# Patient Record
Sex: FEMALE | Race: BLACK OR AFRICAN AMERICAN | NOT HISPANIC OR LATINO | Employment: FULL TIME | URBAN - METROPOLITAN AREA
[De-identification: names, ages, dates, MRNs, and addresses within clinical notes are randomized per-mention and may not be internally consistent; named-entity substitution may affect disease eponyms.]

---

## 2023-12-14 ENCOUNTER — HOSPITAL ENCOUNTER (OUTPATIENT)
Facility: HOSPITAL | Age: 23
Setting detail: OBSERVATION
Discharge: HOME | End: 2023-12-17
Attending: EMERGENCY MEDICINE
Payer: COMMERCIAL

## 2023-12-14 DIAGNOSIS — Z79.4 TYPE 2 DIABETES MELLITUS WITHOUT COMPLICATION, WITH LONG-TERM CURRENT USE OF INSULIN (MULTI): ICD-10-CM

## 2023-12-14 DIAGNOSIS — E11.65 UNCONTROLLED TYPE 2 DIABETES MELLITUS WITH HYPERGLYCEMIA (MULTI): ICD-10-CM

## 2023-12-14 DIAGNOSIS — E11.9 TYPE 2 DIABETES MELLITUS WITHOUT COMPLICATION, WITH LONG-TERM CURRENT USE OF INSULIN (MULTI): ICD-10-CM

## 2023-12-14 DIAGNOSIS — Z79.4 TYPE 2 DIABETES MELLITUS WITH HYPERGLYCEMIA, WITH LONG-TERM CURRENT USE OF INSULIN (MULTI): Primary | ICD-10-CM

## 2023-12-14 DIAGNOSIS — E11.65 TYPE 2 DIABETES MELLITUS WITH HYPERGLYCEMIA, WITH LONG-TERM CURRENT USE OF INSULIN (MULTI): Primary | ICD-10-CM

## 2023-12-14 DIAGNOSIS — R73.9 HYPERGLYCEMIA: ICD-10-CM

## 2023-12-14 LAB
ALBUMIN SERPL BCP-MCNC: 3.9 G/DL (ref 3.4–5)
ALP SERPL-CCNC: 124 U/L (ref 33–110)
ALT SERPL W P-5'-P-CCNC: 9 U/L (ref 7–45)
ANION GAP BLDV CALCULATED.4IONS-SCNC: 16 MMOL/L (ref 10–25)
ANION GAP SERPL CALC-SCNC: 17 MMOL/L (ref 10–20)
AST SERPL W P-5'-P-CCNC: 12 U/L (ref 9–39)
BASE EXCESS BLDV CALC-SCNC: -0.1 MMOL/L (ref -2–3)
BASOPHILS # BLD AUTO: 0.04 X10*3/UL (ref 0–0.1)
BASOPHILS NFR BLD AUTO: 0.4 %
BILIRUB SERPL-MCNC: 0.4 MG/DL (ref 0–1.2)
BODY TEMPERATURE: 37 DEGREES CELSIUS
BUN SERPL-MCNC: 7 MG/DL (ref 6–23)
CA-I BLDV-SCNC: 1.24 MMOL/L (ref 1.1–1.33)
CALCIUM SERPL-MCNC: 9.8 MG/DL (ref 8.6–10.6)
CARDIAC TROPONIN I PNL SERPL HS: <3 NG/L (ref 0–34)
CHLORIDE BLDV-SCNC: 99 MMOL/L (ref 98–107)
CHLORIDE SERPL-SCNC: 98 MMOL/L (ref 98–107)
CO2 SERPL-SCNC: 26 MMOL/L (ref 21–32)
CREAT SERPL-MCNC: 0.5 MG/DL (ref 0.5–1.05)
EOSINOPHIL # BLD AUTO: 0.11 X10*3/UL (ref 0–0.7)
EOSINOPHIL NFR BLD AUTO: 1 %
ERYTHROCYTE [DISTWIDTH] IN BLOOD BY AUTOMATED COUNT: 15.1 % (ref 11.5–14.5)
GFR SERPL CREATININE-BSD FRML MDRD: >90 ML/MIN/1.73M*2
GLUCOSE BLD MANUAL STRIP-MCNC: 402 MG/DL (ref 74–99)
GLUCOSE BLDV-MCNC: 434 MG/DL (ref 74–99)
GLUCOSE SERPL-MCNC: 379 MG/DL (ref 74–99)
HCO3 BLDV-SCNC: 25.4 MMOL/L (ref 22–26)
HCT VFR BLD AUTO: 36.9 % (ref 36–46)
HCT VFR BLD EST: 38 % (ref 36–46)
HGB BLD-MCNC: 11.6 G/DL (ref 12–16)
HGB BLDV-MCNC: 12.5 G/DL (ref 12–16)
IMM GRANULOCYTES # BLD AUTO: 0.03 X10*3/UL (ref 0–0.7)
IMM GRANULOCYTES NFR BLD AUTO: 0.3 % (ref 0–0.9)
INHALED O2 CONCENTRATION: 0 %
LACTATE BLDV-SCNC: 1.1 MMOL/L (ref 0.4–2)
LYMPHOCYTES # BLD AUTO: 3.01 X10*3/UL (ref 1.2–4.8)
LYMPHOCYTES NFR BLD AUTO: 26.4 %
MAGNESIUM SERPL-MCNC: 1.65 MG/DL (ref 1.6–2.4)
MCH RBC QN AUTO: 24.7 PG (ref 26–34)
MCHC RBC AUTO-ENTMCNC: 31.4 G/DL (ref 32–36)
MCV RBC AUTO: 79 FL (ref 80–100)
MONOCYTES # BLD AUTO: 0.8 X10*3/UL (ref 0.1–1)
MONOCYTES NFR BLD AUTO: 7 %
NEUTROPHILS # BLD AUTO: 7.39 X10*3/UL (ref 1.2–7.7)
NEUTROPHILS NFR BLD AUTO: 64.9 %
NRBC BLD-RTO: 0 /100 WBCS (ref 0–0)
OXYHGB MFR BLDV: 60.6 % (ref 45–75)
PCO2 BLDV: 44 MM HG (ref 41–51)
PH BLDV: 7.37 PH (ref 7.33–7.43)
PHOSPHATE SERPL-MCNC: 3.5 MG/DL (ref 2.5–4.9)
PLATELET # BLD AUTO: 360 X10*3/UL (ref 150–450)
PO2 BLDV: 38 MM HG (ref 35–45)
POTASSIUM BLDV-SCNC: 4.1 MMOL/L (ref 3.5–5.3)
POTASSIUM SERPL-SCNC: 4.2 MMOL/L (ref 3.5–5.3)
PROT SERPL-MCNC: 8 G/DL (ref 6.4–8.2)
RBC # BLD AUTO: 4.7 X10*6/UL (ref 4–5.2)
SAO2 % BLDV: 62 % (ref 45–75)
SODIUM BLDV-SCNC: 136 MMOL/L (ref 136–145)
SODIUM SERPL-SCNC: 137 MMOL/L (ref 136–145)
WBC # BLD AUTO: 11.4 X10*3/UL (ref 4.4–11.3)

## 2023-12-14 PROCEDURE — 84100 ASSAY OF PHOSPHORUS: CPT | Performed by: EMERGENCY MEDICINE

## 2023-12-14 PROCEDURE — 82435 ASSAY OF BLOOD CHLORIDE: CPT | Performed by: EMERGENCY MEDICINE

## 2023-12-14 PROCEDURE — 82330 ASSAY OF CALCIUM: CPT | Performed by: EMERGENCY MEDICINE

## 2023-12-14 PROCEDURE — 85025 COMPLETE CBC W/AUTO DIFF WBC: CPT | Performed by: EMERGENCY MEDICINE

## 2023-12-14 PROCEDURE — 83735 ASSAY OF MAGNESIUM: CPT | Performed by: EMERGENCY MEDICINE

## 2023-12-14 PROCEDURE — 99285 EMERGENCY DEPT VISIT HI MDM: CPT | Performed by: EMERGENCY MEDICINE

## 2023-12-14 PROCEDURE — 85018 HEMOGLOBIN: CPT | Performed by: EMERGENCY MEDICINE

## 2023-12-14 PROCEDURE — 36415 COLL VENOUS BLD VENIPUNCTURE: CPT | Performed by: EMERGENCY MEDICINE

## 2023-12-14 PROCEDURE — 84484 ASSAY OF TROPONIN QUANT: CPT | Performed by: EMERGENCY MEDICINE

## 2023-12-14 PROCEDURE — 82947 ASSAY GLUCOSE BLOOD QUANT: CPT | Mod: 59

## 2023-12-14 PROCEDURE — 82947 ASSAY GLUCOSE BLOOD QUANT: CPT

## 2023-12-14 PROCEDURE — 99284 EMERGENCY DEPT VISIT MOD MDM: CPT | Performed by: EMERGENCY MEDICINE

## 2023-12-14 ASSESSMENT — COLUMBIA-SUICIDE SEVERITY RATING SCALE - C-SSRS
6. HAVE YOU EVER DONE ANYTHING, STARTED TO DO ANYTHING, OR PREPARED TO DO ANYTHING TO END YOUR LIFE?: NO
1. IN THE PAST MONTH, HAVE YOU WISHED YOU WERE DEAD OR WISHED YOU COULD GO TO SLEEP AND NOT WAKE UP?: NO
2. HAVE YOU ACTUALLY HAD ANY THOUGHTS OF KILLING YOURSELF?: NO

## 2023-12-15 PROBLEM — E11.65 HYPERGLYCEMIA DUE TO DIABETES MELLITUS (MULTI): Status: ACTIVE | Noted: 2023-12-15

## 2023-12-15 PROBLEM — E11.65 UNCONTROLLED TYPE 2 DIABETES MELLITUS WITH HYPERGLYCEMIA (MULTI): Status: ACTIVE | Noted: 2023-12-15

## 2023-12-15 LAB
ANION GAP SERPL CALC-SCNC: 16 MMOL/L (ref 10–20)
APPEARANCE UR: ABNORMAL
BACTERIA #/AREA URNS AUTO: ABNORMAL /HPF
BILIRUB UR STRIP.AUTO-MCNC: NEGATIVE MG/DL
BUN SERPL-MCNC: 8 MG/DL (ref 6–23)
CALCIUM SERPL-MCNC: 8.7 MG/DL (ref 8.6–10.6)
CHLORIDE SERPL-SCNC: 102 MMOL/L (ref 98–107)
CO2 SERPL-SCNC: 25 MMOL/L (ref 21–32)
COLOR UR: YELLOW
CREAT SERPL-MCNC: 0.36 MG/DL (ref 0.5–1.05)
ERYTHROCYTE [DISTWIDTH] IN BLOOD BY AUTOMATED COUNT: 14.8 % (ref 11.5–14.5)
EST. AVERAGE GLUCOSE BLD GHB EST-MCNC: 326 MG/DL
GFR SERPL CREATININE-BSD FRML MDRD: >90 ML/MIN/1.73M*2
GLUCOSE BLD MANUAL STRIP-MCNC: 245 MG/DL (ref 74–99)
GLUCOSE BLD MANUAL STRIP-MCNC: 246 MG/DL (ref 74–99)
GLUCOSE BLD MANUAL STRIP-MCNC: 281 MG/DL (ref 74–99)
GLUCOSE BLD MANUAL STRIP-MCNC: 288 MG/DL (ref 74–99)
GLUCOSE BLD MANUAL STRIP-MCNC: 306 MG/DL (ref 74–99)
GLUCOSE BLD MANUAL STRIP-MCNC: 326 MG/DL (ref 74–99)
GLUCOSE SERPL-MCNC: 262 MG/DL (ref 74–99)
GLUCOSE UR STRIP.AUTO-MCNC: ABNORMAL MG/DL
HBA1C MFR BLD: 13 %
HCT VFR BLD AUTO: 31.3 % (ref 36–46)
HGB BLD-MCNC: 9.9 G/DL (ref 12–16)
HOLD SPECIMEN: NORMAL
KETONES UR STRIP.AUTO-MCNC: ABNORMAL MG/DL
LEUKOCYTE ESTERASE UR QL STRIP.AUTO: ABNORMAL
MCH RBC QN AUTO: 24.5 PG (ref 26–34)
MCHC RBC AUTO-ENTMCNC: 31.6 G/DL (ref 32–36)
MCV RBC AUTO: 78 FL (ref 80–100)
NITRITE UR QL STRIP.AUTO: NEGATIVE
NRBC BLD-RTO: 0 /100 WBCS (ref 0–0)
PH UR STRIP.AUTO: 6 [PH]
PLATELET # BLD AUTO: 304 X10*3/UL (ref 150–450)
POTASSIUM SERPL-SCNC: 3.5 MMOL/L (ref 3.5–5.3)
PROT UR STRIP.AUTO-MCNC: ABNORMAL MG/DL
RBC # BLD AUTO: 4.04 X10*6/UL (ref 4–5.2)
RBC # UR STRIP.AUTO: ABNORMAL /UL
RBC #/AREA URNS AUTO: >20 /HPF
SODIUM SERPL-SCNC: 139 MMOL/L (ref 136–145)
SP GR UR STRIP.AUTO: 1.04
SQUAMOUS #/AREA URNS AUTO: ABNORMAL /HPF
UROBILINOGEN UR STRIP.AUTO-MCNC: <2 MG/DL
WBC # BLD AUTO: 10.4 X10*3/UL (ref 4.4–11.3)
WBC #/AREA URNS AUTO: >50 /HPF

## 2023-12-15 PROCEDURE — 96360 HYDRATION IV INFUSION INIT: CPT

## 2023-12-15 PROCEDURE — 2500000002 HC RX 250 W HCPCS SELF ADMINISTERED DRUGS (ALT 637 FOR MEDICARE OP, ALT 636 FOR OP/ED): Performed by: STUDENT IN AN ORGANIZED HEALTH CARE EDUCATION/TRAINING PROGRAM

## 2023-12-15 PROCEDURE — G0378 HOSPITAL OBSERVATION PER HR: HCPCS

## 2023-12-15 PROCEDURE — 87086 URINE CULTURE/COLONY COUNT: CPT | Performed by: EMERGENCY MEDICINE

## 2023-12-15 PROCEDURE — 96361 HYDRATE IV INFUSION ADD-ON: CPT

## 2023-12-15 PROCEDURE — 81001 URINALYSIS AUTO W/SCOPE: CPT | Performed by: EMERGENCY MEDICINE

## 2023-12-15 PROCEDURE — 85027 COMPLETE CBC AUTOMATED: CPT

## 2023-12-15 PROCEDURE — 2500000002 HC RX 250 W HCPCS SELF ADMINISTERED DRUGS (ALT 637 FOR MEDICARE OP, ALT 636 FOR OP/ED): Mod: SE,MUE | Performed by: PHYSICIAN ASSISTANT

## 2023-12-15 PROCEDURE — 2500000004 HC RX 250 GENERAL PHARMACY W/ HCPCS (ALT 636 FOR OP/ED): Performed by: STUDENT IN AN ORGANIZED HEALTH CARE EDUCATION/TRAINING PROGRAM

## 2023-12-15 PROCEDURE — 36415 COLL VENOUS BLD VENIPUNCTURE: CPT

## 2023-12-15 PROCEDURE — 83036 HEMOGLOBIN GLYCOSYLATED A1C: CPT

## 2023-12-15 PROCEDURE — 2500000002 HC RX 250 W HCPCS SELF ADMINISTERED DRUGS (ALT 637 FOR MEDICARE OP, ALT 636 FOR OP/ED): Performed by: PHYSICIAN ASSISTANT

## 2023-12-15 PROCEDURE — 99223 1ST HOSP IP/OBS HIGH 75: CPT | Performed by: PHYSICIAN ASSISTANT

## 2023-12-15 PROCEDURE — RXMED WILLOW AMBULATORY MEDICATION CHARGE

## 2023-12-15 PROCEDURE — 82947 ASSAY GLUCOSE BLOOD QUANT: CPT

## 2023-12-15 PROCEDURE — 80048 BASIC METABOLIC PNL TOTAL CA: CPT

## 2023-12-15 PROCEDURE — 94762 N-INVAS EAR/PLS OXIMTRY CONT: CPT | Mod: MUE

## 2023-12-15 PROCEDURE — 82947 ASSAY GLUCOSE BLOOD QUANT: CPT | Mod: 59

## 2023-12-15 RX ORDER — INSULIN LISPRO 100 [IU]/ML
0-10 INJECTION, SOLUTION INTRAVENOUS; SUBCUTANEOUS
Status: DISCONTINUED | OUTPATIENT
Start: 2023-12-15 | End: 2023-12-17 | Stop reason: HOSPADM

## 2023-12-15 RX ORDER — INSULIN DEGLUDEC 100 U/ML
20 INJECTION, SOLUTION SUBCUTANEOUS EVERY 24 HOURS
Status: DISCONTINUED | OUTPATIENT
Start: 2023-12-15 | End: 2023-12-16

## 2023-12-15 RX ORDER — INSULIN GLARGINE 100 [IU]/ML
20 INJECTION, SOLUTION SUBCUTANEOUS NIGHTLY
Qty: 15 ML | Refills: 0 | Status: SHIPPED | OUTPATIENT
Start: 2023-12-15 | End: 2023-12-17 | Stop reason: SDUPTHER

## 2023-12-15 RX ORDER — BLOOD-GLUCOSE SENSOR
1 EACH MISCELLANEOUS
Qty: 2 EACH | Refills: 0 | Status: SHIPPED | OUTPATIENT
Start: 2023-12-15 | End: 2024-01-09 | Stop reason: SDUPTHER

## 2023-12-15 RX ORDER — DEXTROSE MONOHYDRATE 100 MG/ML
0.3 INJECTION, SOLUTION INTRAVENOUS ONCE AS NEEDED
Status: DISCONTINUED | OUTPATIENT
Start: 2023-12-15 | End: 2023-12-17 | Stop reason: HOSPADM

## 2023-12-15 RX ORDER — INSULIN LISPRO 100 [IU]/ML
0-5 INJECTION, SOLUTION INTRAVENOUS; SUBCUTANEOUS
Status: DISCONTINUED | OUTPATIENT
Start: 2023-12-15 | End: 2023-12-15

## 2023-12-15 RX ORDER — INSULIN LISPRO 100 [IU]/ML
5 INJECTION, SOLUTION INTRAVENOUS; SUBCUTANEOUS
Status: DISCONTINUED | OUTPATIENT
Start: 2023-12-15 | End: 2023-12-16

## 2023-12-15 RX ORDER — ISOPROPYL ALCOHOL 70 ML/100ML
1 SWAB TOPICAL 4 TIMES DAILY
Qty: 400 EACH | Refills: 0 | Status: SHIPPED | OUTPATIENT
Start: 2023-12-15 | End: 2024-03-14

## 2023-12-15 RX ORDER — IBUPROFEN 200 MG
1 CAPSULE ORAL 4 TIMES DAILY
Qty: 400 STRIP | Refills: 0 | Status: SHIPPED | OUTPATIENT
Start: 2023-12-15 | End: 2024-03-25

## 2023-12-15 RX ORDER — BLOOD-GLUCOSE CONTROL, NORMAL
1 EACH MISCELLANEOUS 4 TIMES DAILY
Qty: 400 EACH | Refills: 0 | Status: SHIPPED | OUTPATIENT
Start: 2023-12-15

## 2023-12-15 RX ORDER — IBUPROFEN 200 MG
1 CAPSULE ORAL 4 TIMES DAILY
Qty: 400 STRIP | Refills: 0 | Status: SHIPPED | OUTPATIENT
Start: 2023-12-15

## 2023-12-15 RX ORDER — DEXTROSE 4 G
1 TABLET,CHEWABLE ORAL 4 TIMES DAILY
Qty: 1 EACH | Refills: 0 | Status: SHIPPED | OUTPATIENT
Start: 2023-12-15 | End: 2024-03-14

## 2023-12-15 RX ORDER — DEXTROSE 50 % IN WATER (D50W) INTRAVENOUS SYRINGE
25
Status: DISCONTINUED | OUTPATIENT
Start: 2023-12-15 | End: 2023-12-17 | Stop reason: HOSPADM

## 2023-12-15 RX ORDER — GLUCAGON INJECTION, SOLUTION 1 MG/.2ML
0.2 INJECTION, SOLUTION SUBCUTANEOUS ONCE AS NEEDED
Qty: 0.2 ML | Refills: 0 | Status: SHIPPED | OUTPATIENT
Start: 2023-12-15

## 2023-12-15 RX ORDER — INSULIN LISPRO 100 [IU]/ML
INJECTION, SOLUTION INTRAVENOUS; SUBCUTANEOUS
Qty: 15 ML | Refills: 0 | Status: SHIPPED | OUTPATIENT
Start: 2023-12-15 | End: 2024-01-09 | Stop reason: SDUPTHER

## 2023-12-15 RX ORDER — INSULIN LISPRO 100 [IU]/ML
5 INJECTION, SOLUTION INTRAVENOUS; SUBCUTANEOUS ONCE
Status: COMPLETED | OUTPATIENT
Start: 2023-12-15 | End: 2023-12-15

## 2023-12-15 RX ADMIN — INSULIN LISPRO 5 UNITS: 100 INJECTION, SOLUTION INTRAVENOUS; SUBCUTANEOUS at 01:27

## 2023-12-15 RX ADMIN — INSULIN LISPRO 8 UNITS: 100 INJECTION, SOLUTION INTRAVENOUS; SUBCUTANEOUS at 14:03

## 2023-12-15 RX ADMIN — INSULIN DEGLUDEC INJECTION 20 UNITS: 100 INJECTION, SOLUTION SUBCUTANEOUS at 20:11

## 2023-12-15 RX ADMIN — INSULIN LISPRO 5 UNITS: 100 INJECTION, SOLUTION INTRAVENOUS; SUBCUTANEOUS at 16:37

## 2023-12-15 RX ADMIN — INSULIN LISPRO 6 UNITS: 100 INJECTION, SOLUTION INTRAVENOUS; SUBCUTANEOUS at 16:36

## 2023-12-15 RX ADMIN — SODIUM CHLORIDE, POTASSIUM CHLORIDE, SODIUM LACTATE AND CALCIUM CHLORIDE 1000 ML: 600; 310; 30; 20 INJECTION, SOLUTION INTRAVENOUS at 01:28

## 2023-12-15 RX ADMIN — INSULIN LISPRO 4 UNITS: 100 INJECTION, SOLUTION INTRAVENOUS; SUBCUTANEOUS at 08:26

## 2023-12-15 ASSESSMENT — PAIN SCALES - GENERAL
PAINLEVEL_OUTOF10: 0 - NO PAIN

## 2023-12-15 ASSESSMENT — ENCOUNTER SYMPTOMS: FATIGUE: 1

## 2023-12-15 NOTE — ED PROVIDER NOTES
CC: general malaise     HPI:  23-year-old female with history of insulin-dependent type 2 diabetes presents to the emergency department with 1 to 2 weeks of increased fatigue, polyuria, and polydipsia.    Patient reports has been moving a lot recently, is out of all of her insulin over the last 1 to 2 months.  Has not been checking her blood sugar either.  Reports increased fatigue over the last few weeks and states she feels dehydrated.  Does have a prior history of DKA requiring MICU admission earlier this year.    Patient states she tried calling her pharmacy for insulin refills, but was told she was out of refills and need to speak with the provider.  Tried to reach out to her previous provider and was unable to get a hold of them.    Records Reviewed:  Recent available ED and inpatient notes reviewed in EMR.    PMHx/PSHx:  Per HPI.   - has a past medical history of Type 2 diabetes mellitus with ketoacidosis without coma (CMS/[facility]).  - has a past surgical history that includes Other surgical history (03/19/2019).  - has Hyperglycemia due to diabetes mellitus (CMS/[facility]) on their problem list.    Medications:  Reviewed in EMR. See EMR for complete list of medications and doses.    Allergies:  Patient has no known allergies.    Social History:  - Tobacco:  has no history on file for tobacco use.   - Alcohol:  has no history on file for alcohol use.   - Illicit Drugs:  has no history on file for drug use.     ROS:  Per HPI.       ???????????????????????????????????????????????????????????????  Triage Vitals:  T 36.2 °C (97.2 °F)    /82  RR 18  O2 98 %      Physical Exam  Vitals and nursing note reviewed.   Constitutional:       General: She is not in acute distress.  HENT:      Head: Atraumatic.      Mouth/Throat:      Mouth: Mucous membranes are dry.   Eyes:      General: No scleral icterus.     Conjunctiva/sclera: Conjunctivae normal.   Cardiovascular:      Rate and Rhythm: Regular rhythm.  Tachycardia present.      Heart sounds: No murmur heard.     No friction rub. No gallop.   Pulmonary:      Effort: No respiratory distress.      Breath sounds: Normal breath sounds. No wheezing or rales.   Chest:      Chest wall: No tenderness.   Abdominal:      Palpations: Abdomen is soft.      Tenderness: There is no abdominal tenderness.   Musculoskeletal:      Right lower leg: No edema.      Left lower leg: No edema.   Skin:     General: Skin is warm and dry.      Findings: No rash.   Neurological:      Mental Status: She is alert.      Cranial Nerves: No facial asymmetry.   Psychiatric:         Mood and Affect: Mood normal.         Behavior: Behavior normal.       ???????????????????????????????????????????????????????????????  Assessment and Plan:  23-year-old female with history of insulin-dependent type 2 diabetes presents to the emergency department for generalized fatigue, polyuria, and polydipsia.  Mildly tachycardic on arrival, blood sugar in the high 300s, but is not acidotic, reassuring against DKA.  Appears dry on exam which is likely contributing to the tachycardia, was ordered a liter of fluids for rehydration.    Was ordered subcutaneous lispro as well for blood sugar control.    Given the patient's out of all medications and supplies, do feel that she would benefit from coming in for diabetic education and refills on her medications.    Discussed with CDU, patient will be admitted for further management.    Social Determinants Limiting Care:  Difficulty paying for/obtaining medications and Housing insecurity    Disposition:  Admit to CDU    --  Blossom Ramos MD  Emergency Medicine, PGY-3      Procedures ? SmartLinks last updated 12/15/2023 1:24 AM        Blossom Ramos MD  Resident  12/15/23 0127

## 2023-12-15 NOTE — CONSULTS
Inpatient Diabetes Education Consult    Reason for Visit:  Brenda Lawton is a 23 y.o. female who presents for hyperglycemia 2' running out of insulin at home while in the process of moving from one residence to another    Consulting Service/Provider: Dr. SYLVIE Llamas    Visit Type: Initial visit    Visit Modality: In-person    Discharge Equipment/Supply Needs: she will need BG meter, strips, lancets; She will need insulin (pens and associated supplies); she will need Prudencio 3 CGM -- one placed in CDU.       Patient has supplies at home:  she has no supplies at home at this time.      Patient History and Assessment:  New diagnosis:  n/a  Previous diagnosis: Type 2  Patient known to Diabetes Education department: No  Treatment prior to hospital admission: Insulin: Rapid acting, Long acting  Blood glucose testing: Before Meals and Continuous glucose monitor  Complications: obesity  PTA Medications:    Current Outpatient Medications   Medication Instructions    blood-glucose sensor device USE AS DIRECTED TO CONTINUOUS GLUCOSE MONITORING. CHANGE EVERY 10 DAYS    Dexcom G4 platinum transmitter device FOR CONTINUOUS GLUCOSE MONITORING, CHANGE EVERY 90 DAYS.    doxycycline (Vibramycin) 100 mg capsule TAKE 1 CAPSULE BY MOUTH TWO TIMES A DAY    insulin glargine (Toujeo Max Solostar, 2 unit dial,) 300 unit/mL (3 mL) injection INJECT 100 UNITS UNDER THE SKIN AT BEDTIME       Glucose   Date/Time Value Ref Range Status   12/15/2023 05:41  (H) 74 - 99 mg/dL Final   12/14/2023 09:10  (H) 74 - 99 mg/dL Final   02/25/2023 04:27  (H) 74 - 99 mg/dL Final   02/24/2023 04:31  (H) 74 - 99 mg/dL Final   02/24/2023 03:29 PM CANCELED       Comment:     Result canceled by the ancillary.   02/24/2023 04:04  (H) 74 - 99 mg/dL Final   02/23/2023 12:03  (H) 74 - 99 mg/dL Final   02/23/2023 06:55  (H) 74 - 99 mg/dL Final   02/22/2023 12:22  (H) 74 - 99 mg/dL Final   02/22/2023 08:18  (H) 74 - 99  "mg/dL Final   02/22/2023 04:34  (HH) 74 - 99 mg/dL Final     Comment:     glu and bic   Called- RB to valeri barron  , 02/22/2023 05:08   11/26/2022 03:08  (H) 74 - 99 mg/dL Final     No results found for: \"CPEPTIDE\"  Hemoglobin A1C   Date Value Ref Range Status   12/15/2023 13.0 (H) see below % Final   02/22/2023 CANCELED       Comment:          Diagnosis of Diabetes-Adults   Non-Diabetic: < or = 5.6%   Increased risk for developing diabetes: 5.7-6.4%   Diagnostic of diabetes: > or = 6.5%  .       Monitoring of Diabetes                Age (y)     Therapeutic Goal (%)   Adults:          >18           <7.0   Pediatrics:    13-18           <7.5                   7-12           <8.0                   0- 6            7.5-8.5   American Diabetes Association. Diabetes Care 33(S1), Jan 2010.    Result canceled by the ancillary.     02/22/2023 12.8 (A) % Final     Comment:          Diagnosis of Diabetes-Adults   Non-Diabetic: < or = 5.6%   Increased risk for developing diabetes: 5.7-6.4%   Diagnostic of diabetes: > or = 6.5%  .       Monitoring of Diabetes                Age (y)     Therapeutic Goal (%)   Adults:          >18           <7.0   Pediatrics:    13-18           <7.5                   7-12           <8.0                   0- 6            7.5-8.5   American Diabetes Association. Diabetes Care 33(S1), Jan 2010.     07/14/2021 11.6 % Final     Comment:          Diagnosis of Diabetes-Adults   Non-Diabetic: < or = 5.6%   Increased risk for developing diabetes: 5.7-6.4%   Diagnostic of diabetes: > or = 6.5%  .       Monitoring of Diabetes                Age (y)     Therapeutic Goal (%)   Adults:          >18           <7.0   Pediatrics:    13-18           <7.5                   7-12           <8.0                   0- 6            7.5-8.5   American Diabetes Association. Diabetes Care 33(S1), Jan 2010.     02/25/2020 8.1 % Final     Comment:          Diagnosis of Diabetes-Adults   Non-Diabetic: < or = 5.6%   " Increased risk for developing diabetes: 5.7-6.4%   Diagnostic of diabetes: > or = 6.5%  .       Monitoring of Diabetes                Age (y)     Therapeutic Goal (%)   Adults:          >18           <7.0   Pediatrics:    13-18           <7.5                   7-12           <8.0                   0- 6            7.5-8.5   American Diabetes Association. Diabetes Care 33(S1), Jan 2010.         Patient Learning/Readiness Assessment:  Ms. Lawton is familiar with using insulin -- she is here because she ran out of her meds and needed to be seen by a provider for elevated BG and A1c.    Interventions/Topics Covered:  See After Visit Summary for handouts/information sheets provided to patient.  Education Documentation  No documentation found.        Additional topics covered: We reviewed her insulin regimen.  She is comfortable with the regimen she is currently ordered.    We reviewed importance of follow up and establishing care with PCP and/or endocrinology.  She is agreeable to referral with [facility].    She had worn a CGM in the past (Dex Com G-6).  Her current phone no longer supports dex com crissy but she does have ale 3 crissy in place.  Ale 3 CGM applied to her left upper post arm.  Device reviewed with her.    Additional materials provided: [employer] 3 information.    Trial CGM provided to patient: FreeStyle Ale    Education Outcome/Recommendations:        Recommendations for bedside nursing: Allow patient to self-inject insulin (supervised)    Recommendations for Providers: Inpatient consult to Endocrinology and Diabetes supplies needed for discharge    Additional Comments: Ms. Lawton is agreeable to follow up visits if she is admitted.  She does not anticipate hospital admission at this time.  Endocrinology team made aware of CGM placement.  Time spent:  30 mins.

## 2023-12-15 NOTE — ED TRIAGE NOTES
Pt reports feeling dry/dehydrated, nausea, SOB, excessive thirst. States she has hx of DKA and this feels similar. Pt did not check sugar today. Pt endorses noncompliance with checking BGL, her dexcom, her insulin and her diet. BGL in triage 402.

## 2023-12-15 NOTE — H&P
History and Physical  [facility] CLINICAL DECISION  Patient: Brenda Lawton  MRN: [id number]  : 2000  Date of Evaluation: 2023  ED Provider: MAGUE Verduzco      Limitations to history: None  Independent Historian: Patient  External Records Reviewed: Yes      Patient History:  Brenda Lawton is a 23 y.o. female with past medical history of T2DM, who presents to the clinical decision unit for hyperglycemia.  Patient reports presented to the ED for symptoms of this fruity breath, polyuria, polydipsia, shortness of breath, malaise and believes she was then ketoacidosis.  She states has not been able to take medication for a little over a month, due to recently moved and has no prescribing provider.  She denies fever, chills, urinary symptoms, nausea/vomiting, abdominal pain, chest pain or dizziness.    The acute medical evaluation included  -Initial POCT blood glucose 402  -VBG shows glucose at 434  -CBC shows no significant impairment  -CMP shows glucose elevated at 379, alkaline phosphorus at 124, otherwise no electrolyte or renal impairment.  -Troponin < 3  -Phosphorus 3.5  -Magnesium 1.6 by  -Urinalysis has trace of leukocyte Estrace, 2+ ketones, 3+ blood, 3+ glucose, 2+ protein and 1+ bacteria  -Lispro 5 units and 1 LR bolus  Last A1c on 2023 was 12.8  After discussion with the ED provider, a decision was made to admit the patient to the Clinical Decision Unit for hyperglycemia. Plan is continue fluids, repeat POCT glucose and consult endocrinology and diabetic education team.    In the emergency department, the acute evaluation included:  Orders Placed This Encounter   Procedures    Urine Culture    CBC and Auto Differential    Comprehensive Metabolic Panel    Troponin I, High Sensitivity    Blood Gas Venous Full Panel    Magnesium    Urinalysis with Reflex Microscopic and Culture    Phosphorus    Urinalysis with Reflex Microscopic and Culture    Extra Urine Gray  Tube    Microscopic Only, Urine    CBC    Basic metabolic panel    Hemoglobin A1C    Adult diet Carb Controlled; 90 gram carb/meal, 60 gram Carb evening snack    Vital Signs    Activity (specify) No Restrictions    Notify provider (specify parameters)    Notify provider (specify parameters)    Notify provider (specify parameters)    Glucose 10-70 mg/dL & CONSCIOUS- Give 15 Grams of Carbohydrates and repeat until blood glucose level reaches 100 mg/dL or greater.    Notify provider (specify parameters)    Notify provider (specify parameters)    Notify provider (specify parameters)    Full code    Inpatient consult to Endocrinology    POCT pregnancy, urine manually resulted    POCT GLUCOSE    POCT GLUCOSE    POCT GLUCOSE    Electrocardiogram, 12-lead PRN ACS symptoms    Insert and maintain peripheral IV    Send to CDU    Initiate observation status       I reviewed the below labs and imaging as ordered by the ED provider:  No orders to display       Labs Reviewed   CBC WITH AUTO DIFFERENTIAL - Abnormal       Result Value    WBC 11.4 (*)     nRBC 0.0      RBC 4.70      Hemoglobin 11.6 (*)     Hematocrit 36.9      MCV 79 (*)     MCH 24.7 (*)     MCHC 31.4 (*)     RDW 15.1 (*)     Platelets 360      Neutrophils % 64.9      Immature Granulocytes %, Automated 0.3      Lymphocytes % 26.4      Monocytes % 7.0      Eosinophils % 1.0      Basophils % 0.4      Neutrophils Absolute 7.39      Immature Granulocytes Absolute, Automated 0.03      Lymphocytes Absolute 3.01      Monocytes Absolute 0.80      Eosinophils Absolute 0.11      Basophils Absolute 0.04     COMPREHENSIVE METABOLIC PANEL - Abnormal    Glucose 379 (*)     Sodium 137      Potassium 4.2      Chloride 98      Bicarbonate 26      Anion Gap 17      Urea Nitrogen 7      Creatinine 0.50      eGFR >90      Calcium 9.8      Albumin 3.9      Alkaline Phosphatase 124 (*)     Total Protein 8.0      AST 12      Bilirubin, Total 0.4      ALT 9     BLOOD GAS VENOUS FULL PANEL -  Abnormal    POCT pH, Venous 7.37      POCT pCO2, Venous 44      POCT pO2, Venous 38      POCT SO2, Venous 62      POCT Oxy Hemoglobin, Venous 60.6      POCT Hematocrit Calculated, Venous 38.0      POCT Sodium, Venous 136      POCT Potassium, Venous 4.1      POCT Chloride, Venous 99      POCT Ionized Calicum, Venous 1.24      POCT Glucose, Venous 434 (*)     POCT Lactate, Venous 1.1      POCT Base Excess, Venous -0.1      POCT HCO3 Calculated, Venous 25.4      POCT Hemoglobin, Venous 12.5      POCT Anion Gap, Venous 16.0      Patient Temperature 37.0      FiO2 0     URINALYSIS WITH REFLEX MICROSCOPIC AND CULTURE - Abnormal    Color, Urine Yellow      Appearance, Urine Hazy (*)     Specific Gravity, Urine 1.039 (*)     pH, Urine 6.0      Protein, Urine 100 (2+) (*)     Glucose, Urine >=500 (3+) (*)     Blood, Urine LARGE (3+) (*)     Ketones, Urine 80 (2+) (*)     Bilirubin, Urine NEGATIVE      Urobilinogen, Urine <2.0      Nitrite, Urine NEGATIVE      Leukocyte Esterase, Urine TRACE (*)    MICROSCOPIC ONLY, URINE - Abnormal    WBC, Urine >50 (*)     RBC, Urine >20 (*)     Squamous Epithelial Cells, Urine 1-9 (SPARSE)      Bacteria, Urine 1+ (*)    POCT GLUCOSE - Abnormal    POCT Glucose 402 (*)    POCT GLUCOSE - Abnormal    POCT Glucose 326 (*)    TROPONIN I, HIGH SENSITIVITY - Normal    Troponin I, High Sensitivity <3      Narrative:     Less than 99th percentile of normal range cutoff-  Female and children under 18 years old <35 ng/L; Male <54 ng/L: Negative  Repeat testing should be performed if clinically indicated.     Female and children under 18 years old  ng/L; Male  ng/L:  Consistent with possible cardiac damage and possible increased clinical   risk. Serial measurements may help to assess extent of myocardial damage.     >120 ng/L: Consistent with cardiac damage, increased clinical risk and  myocardial infarction. Serial measurements may help assess extent of   myocardial damage.      NOTE:  Children less than 1 year old may have higher baseline troponin   levels and results should be interpreted in conjunction with the overall   clinical context.    NOTE: Troponin I testing is performed using a different   testing methodology at [facility] than at other   [facility] hospitals. Direct result comparisons should only   be made within the same method.     MAGNESIUM - Normal    Magnesium 1.65     PHOSPHORUS - Normal    Phosphorus 3.5     URINE CULTURE   URINALYSIS WITH REFLEX MICROSCOPIC AND CULTURE    Narrative:     The following orders were created for panel order Urinalysis with Reflex Microscopic and Culture.  Procedure                               Abnormality         Status                     ---------                               -----------         ------                     Urinalysis with Reflex M...[[id number]]  Abnormal            Final result               Extra Urine Gray Tube[[id number]]                            Final result                 Please view results for these tests on the individual orders.   EXTRA URINE GRAY TUBE    Extra Tube Hold for add-ons.     CBC   BASIC METABOLIC PANEL   HEMOGLOBIN A1C   POCT PREGNANCY, URINE   POCT GLUCOSE METER   POCT GLUCOSE METER   POCT GLUCOSE METER   POCT GLUCOSE METER   POCT GLUCOSE METER   POCT GLUCOSE METER         Past History     Past Medical History:   Diagnosis Date    Type 2 diabetes mellitus with ketoacidosis without coma (CMS/[facility]) 01/12/2016    Diabetic ketoacidosis in pediatric patient     Past Surgical History:   Procedure Laterality Date    OTHER SURGICAL HISTORY  03/19/2019    Cholecystectomy laparoscopic     Social History     Socioeconomic History    Marital status: Single     Spouse name: Not on file    Number of children: Not on file    Years of education: Not on file    Highest education level: Not on file   Occupational History    Not on file   Tobacco Use    Smoking status: Not on file    Smokeless tobacco: Not on file  "  Substance and Sexual Activity    Alcohol use: Not on file    Drug use: Not on file    Sexual activity: Not on file   Other Topics Concern    Not on file   Social History Narrative    Not on file     Social Determinants of Health     Financial Resource Strain: Not on file   Food Insecurity: Not on file   Transportation Needs: Not on file   Physical Activity: Not on file   Stress: Not on file   Social Connections: Not on file   Intimate Partner Violence: Not on file   Housing Stability: Not on file         Medications/Allergies     Previous Medications    BLOOD-GLUCOSE SENSOR DEVICE    USE AS DIRECTED TO CONTINUOUS GLUCOSE MONITORING. CHANGE EVERY 10 DAYS    DEXCOM G4 PLATINUM TRANSMITTER DEVICE    FOR CONTINUOUS GLUCOSE MONITORING, CHANGE EVERY 90 DAYS.    DOXYCYCLINE (VIBRAMYCIN) 100 MG CAPSULE    TAKE 1 CAPSULE BY MOUTH TWO TIMES A DAY    INSULIN GLARGINE (TOUJEO MAX SOLOSTAR, 2 UNIT DIAL,) 300 UNIT/ML (3 ML) INJECTION    INJECT 100 UNITS UNDER THE SKIN AT BEDTIME     No Known Allergies    Review of systems:  All systems reviewed and otherwise negative, except as stated above in HPI.    Upon admission to the Clinical Decision Unit,  Brenda Lawton is alert and oriented and appears in no acute distress.  Vital signs are within normal limits.  Patient nontoxic appearing and in no acute distress.  Physical examination unremarkable.  Patient educated on plan of care, verbalized understanding.  Patient remained hemodynamically stable and neurologically intact throughout the night.    Physical Exam     Visit Vitals  /85 (BP Location: Right arm, Patient Position: Lying)   Pulse 82   Temp 36 °C (96.8 °F) (Temporal)   Resp 17   Ht 1.778 m (5' 10\")   Wt 118 kg (260 lb)   SpO2 100%   BMI 37.31 kg/m²   BSA 2.41 m²       GENERAL:  The patient appears nourished and normally developed. Vital signs as documented.     HEENT:  Head normocephalic, atraumatic, EOMs intact, PERRLA, Mucous membranes moist. Nares patent " without copious rhinorrhea.  Oropharynx moist and clear.  Uvula midline.    Neck: Supple.  No meningismus.  No swelling.  Trachea midline. No lymphadenopathy.    Pulmonary:  Lungs are clear to auscultation, without any respiratory distress.  No wheezing, crackles or rales.  No hypoxia or dyspnea.  Able to speak full sentences, no accessory muscle use.    Cardia:   Regular rate and rhythm. No murmurs, rubs or gallops.  No JVD.    GI:  Soft, non-distended, non-tender, BS positive x 4 quadrants, No rebound or guarding, no peritoneal signs, no CVA tenderness, no masses or organomegaly.    Musculoskeletal: Symmetrical muscle bulk.  No peripheral edema.  Pulses intact distal.  Able to walk.    Integumentary: Warm, dry and intact.  No pallor or jaundice.  No lesions, rashes or open sores.    NEURO:  No obvious neurological deficits, normal sensation and strength bilaterally.  Speech clear and fluent.  Able to follow commands, CN 2-12 intact.    Psych: Appropriate mood and affect.    Consultants:  1.)  Endocrinology pending        PRN medications: dextrose 10 % in water (D10W), dextrose, glucagon     Impression and Plan  In summary, Brenda Lawton is admitted to the [facility] Clinical Decision Unit for hyperglycemia. Dr. Joleen Puri  is the CDU admission attending.    This patient has been risk-stratified based on available history, physical exam, and related study findings. Admission to the observation status for further diagnosis/treatment/monitoring of hyperglycemia is warranted clinically. This extended period of observation is specifically required to determine the need for hospitalization.     The goals of this admission based on the patient’s clinical problem list are:   1.)  Hyperglycemia  -Accu-checks  -Repeat labs in a.m.  -A1c pending  -Continue LR, repeat POCT.  -POCT glucose at 0421 is 246  -Consulted endocrinology      We will observe the patient for the following endpoints:    1.)  Symptomatic improvement  2.)  No worsening or new symptoms.  3.)  Blood glucose<250  4.)  Symptomatic improvement    When met, appropriate disposition will be arranged.    Geetha Chakraborty, APRN-CNP  [facility]  Emergency department  Extension [phone]

## 2023-12-15 NOTE — PROGRESS NOTES
Progress Note  Clinical Decision Unit  Patient: Brenda Lawton  MRN: [id number]  : 2000    Subjective  Brenda Lawton has been admitted to the CDU for 16 hours. Serial assessments of Brenda Lawton's clinical progress include:  1) Overall feeling well, is a bit weak, eating       Objective  VS reviewed  Physical Exam:    Appearance: Alert, oriented, cooperative, in no acute distress. Well nourished & well hydrated.    Skin: Warm, intact and dry. No lesions, rash, petechiae or purpura.     Eyes: PERRLA, EOMs intact. Conjunctiva pink without injection or exudates. No scleral icterus, hyphema, or subconjunctival hemorrhage.    Neck: Supple, without meningismus.    Pulmonary: Clear bilaterally with good chest wall excursion.    Cardiac: Normal S1, S2 without murmur, rub, gallop or extrasystole. No reproducible chest tenderness.    Abdomen: Soft, nontender    Genitourinary: Exam deferred.    Musculoskeletal: Spontaneously moving all extremities without limitation.     Neurological:  Finger-nose touch is normal, normal sensation, no weakness, no focal findings identified. Ambulating without assistance with steady gait    Psychiatric: Appropriate mood and affect. Kempt appearance.      Assessment / Plan  Brenda Lawton continues to be managed in accordance with the CDU clinical guidelines for hyperglycemia. An update of their clinical problem list included:     Hyperglycemia:   -Endo consulted: appreciate reccs: Tresiba 20 units daily, 5 units Lispro with meals + Sliding Scale Lispro #2   -Diabetic diet  -Diabetes education - completed   -Ordered for meds to beds with plan for discharge tomorrow   -Accuchecks before meals/bedtime     Richard Nathan PA-C   Emergency Medicine/Clinical Decision Unit   [facility]

## 2023-12-15 NOTE — CONSULTS
Inpatient consult to Endocrinology  Consult performed by: Shoshana Toure PA-C  Consult ordered by: RASHEEDA Verduzco-CNP  Reason for consult: DM2 with hyperglycemia 2/2 lack of access to medication  Assessment/Recommendations: Pt would benefit from start of weight-based insulin regimen and endocrine follow up after discharge with CGM           Reason For Consult  DM2 with hyperglycemia 2/2 lack of access to medication     History Of Present Illness  Brenda Lawton is a 23 y.o. female presenting with history of insulin-dependent type 2 diabetes since age 13 who presented to the emergency department with 1 to 2 weeks of increased fatigue, polyuria, and polydipsia.     Patient reports has been moving a lot recently, is out of all of her insulin over the last 1 to 2 months.  Has not been checking her blood sugar either.  Reports increased fatigue over the last few weeks and states she feels dehydrated.  Does have a prior history of DKA requiring MICU admission earlier this year.     Patient states she tried calling her pharmacy for insulin refills, but was told she was out of refills and need to speak with the provider.  Tried to reach out to her previous provider and was unable to get a hold of them..    Diabetes History  Outpatient provider for endocrine care PCP, date of last visit 8/30/23 phone call   Initial diabetes diagnosis was made (year/age) July 2014 as DM2 by peds zoë   Known complications due to diabetes include: hyperglycemia    Home Management    Results from Most Recent A1C  Hemoglobin A1C   Date/Time Value Ref Range Status   12/15/2023 05:41 AM 13.0 (H) see below % Final        Diabetes Problem List Entries with Dates  Problem List:  2023-12: Hyperglycemia due to diabetes mellitus (CMS/[facility])  2023-12: Uncontrolled type 2 diabetes mellitus with hyperglycemia   (CMS/[facility])      History of DKA with Dates:     History where DKA was Reason for Admission in last year 2/2023  Prior DKA was  "12/2017    Insulin administered via shot    Insulin Dose: lantus 20 units, lispro sliding scale, trulicity 0.75mg, dexcom CGM      Past Medical History  She has a past medical history of Type 2 diabetes mellitus with ketoacidosis without coma (CMS/HCC) (01/12/2016).    Surgical History  ERCP in 4/2019 for cholelithiasis      Social History  She has no history on file for tobacco use, alcohol use, and drug use.    Family History  No family history on file.     Allergies  Patient has no known allergies.    Review of Systems   Constitutional:  Positive for fatigue.   All other systems reviewed and are negative.       Physical Exam  Constitutional:       Appearance: Normal appearance. She is obese.      Comments: Very pleasant   HENT:      Head: Normocephalic and atraumatic.      Nose: Nose normal.      Mouth/Throat:      Mouth: Mucous membranes are moist.      Pharynx: Oropharynx is clear.   Eyes:      Extraocular Movements: Extraocular movements intact.      Conjunctiva/sclera: Conjunctivae normal.   Cardiovascular:      Rate and Rhythm: Normal rate and regular rhythm.      Pulses: Normal pulses.   Pulmonary:      Effort: Pulmonary effort is normal.   Abdominal:      General: Abdomen is flat. Bowel sounds are normal.      Palpations: Abdomen is soft.   Skin:     General: Skin is warm and dry.   Neurological:      General: No focal deficit present.      Mental Status: She is alert and oriented to person, place, and time. Mental status is at baseline.   Psychiatric:         Mood and Affect: Mood normal.         Behavior: Behavior normal.         Thought Content: Thought content normal.         Judgment: Judgment normal.          ROS, PMH, FH/SH, surgical history and allergies have been reviewed.    Last Recorded Vitals  Blood pressure 122/81, pulse 72, temperature 36.6 °C (97.9 °F), temperature source Tympanic, resp. rate 18, height 1.778 m (5' 10\"), weight 118 kg (260 lb), SpO2 99 %.    Relevant Results  Results from " "last 7 days   Lab Units 12/15/23  1201 12/15/23  0541 12/15/23  0528 12/15/23  0421 12/15/23  0200 12/14/23  2110 12/14/23  1926   POCT GLUCOSE mg/dL 306*  --  245* 246* 326*  --  402*   GLUCOSE mg/dL  --  262*  --   --   --  379*  --      Scheduled medications  insulin degludec, 20 Units, subcutaneous, q24h  insulin lispro, 0-10 Units, subcutaneous, TID with meals  insulin lispro, 5 Units, subcutaneous, TID with meals      Continuous medications     PRN medications  PRN medications: dextrose 10 % in water (D10W), dextrose, glucagon       Assessment/Plan   Principal Problem:    Hyperglycemia due to diabetes mellitus (CMS/[facility])  Active Problems:    Uncontrolled type 2 diabetes mellitus with hyperglycemia (CMS/[facility])    - Goal BG <180  - start degludec 20u at bedtime        NPO or glucose trending <100mg/dL : reduce to 12u   - start lispro 5u with meals plus scale        NPO or glucose <90mg/dL within 1hr before meal: hold   - continue lispro corrective scale #2 with meals   = 0u  151-200 = 2u  201-250 = 4u  251-300 = 6u  301-350 = 8u  351-400 = 10u    -Accuchecks (not BMP) TIDAC and QHS- kindly ensure QHS Accucheck is drawn; it is often missed   -Hypoglycemia protocol  -Diabetes Diet- low carb 60 CHO  -Will continue to follow and titrate insulin accordingly    Dispo: pt can expect to discharge home tomorrow AM with basal-bolus insulin regimen and CGM. She would benefit from endocrinology follow up.     in terms of ordering supplies, to make it easy, if you go under discharge tab, and click discharge orders on the left, when you go to write discharge orders, if you click on the \"new\" button with the green plus sign, and click \"follow users\" you can search for Kaitlin España PA-C. All insulins and DM supplies saved so you can just select from that list if it is helpful!       HOME GOING RECS  - please order tresiba flex pen U-100 or toujeo solostar U-300 insulin pen \"inject 20 units under the skin once before bed\" " "30 days = 5 pens (15mL of tresiba or 4.5mL of toujeo)  - please order insulin lispro U-100 insulin pen \"inject 5 units plus sliding scale three times daily with meals  = 0u, 151-200 = 2u, 201-250 = 4u, 251-300 = 6u, 301-350 = 8u, 351-400+ = 10u, use up to 40 units daily\" 30 days = 5 pens (15mL)  - please order insulin pen needles 32G 4mm for QID injections, 90 days = 400 pen needles  - please order glucose testing supplies for QID glucose measurement, 90 days = 1 glucose meter, 400 lancets and 400 strips  - please order alcohol swabs  - please order a form of glucagon rescue kit such as baqsimi or Gvoke hypopen  - endocrine will order ale 3 CGM supplies   **write in comment section on all supplies ordered: \"substitutions may be made by pharmacist depending on insurance coverage and what the pharmacy has available\"        I spent 80 minutes in the professional and overall care of this patient.      Shoshana Toure PA-C    "

## 2023-12-16 ENCOUNTER — PHARMACY VISIT (OUTPATIENT)
Dept: PHARMACY | Facility: CLINIC | Age: 23
End: 2023-12-16
Payer: MEDICAID

## 2023-12-16 LAB
BACTERIA UR CULT: NORMAL
GLUCOSE BLD MANUAL STRIP-MCNC: 282 MG/DL (ref 74–99)
GLUCOSE BLD MANUAL STRIP-MCNC: 283 MG/DL (ref 74–99)
GLUCOSE BLD MANUAL STRIP-MCNC: 290 MG/DL (ref 74–99)
GLUCOSE BLD MANUAL STRIP-MCNC: 309 MG/DL (ref 74–99)
GLUCOSE BLD MANUAL STRIP-MCNC: 345 MG/DL (ref 74–99)

## 2023-12-16 PROCEDURE — 99233 SBSQ HOSP IP/OBS HIGH 50: CPT

## 2023-12-16 PROCEDURE — 2500000004 HC RX 250 GENERAL PHARMACY W/ HCPCS (ALT 636 FOR OP/ED): Mod: SE

## 2023-12-16 PROCEDURE — 82947 ASSAY GLUCOSE BLOOD QUANT: CPT | Mod: 59

## 2023-12-16 PROCEDURE — 2500000002 HC RX 250 W HCPCS SELF ADMINISTERED DRUGS (ALT 637 FOR MEDICARE OP, ALT 636 FOR OP/ED): Mod: SE | Performed by: PHYSICIAN ASSISTANT

## 2023-12-16 PROCEDURE — 96361 HYDRATE IV INFUSION ADD-ON: CPT

## 2023-12-16 PROCEDURE — 94762 N-INVAS EAR/PLS OXIMTRY CONT: CPT

## 2023-12-16 PROCEDURE — G0378 HOSPITAL OBSERVATION PER HR: HCPCS

## 2023-12-16 RX ORDER — INSULIN LISPRO 100 [IU]/ML
8 INJECTION, SOLUTION INTRAVENOUS; SUBCUTANEOUS
Status: DISCONTINUED | OUTPATIENT
Start: 2023-12-16 | End: 2023-12-16

## 2023-12-16 RX ORDER — SODIUM CHLORIDE 9 MG/ML
125 INJECTION, SOLUTION INTRAVENOUS CONTINUOUS
Status: DISCONTINUED | OUTPATIENT
Start: 2023-12-16 | End: 2023-12-17 | Stop reason: HOSPADM

## 2023-12-16 RX ORDER — INSULIN LISPRO 100 [IU]/ML
10 INJECTION, SOLUTION INTRAVENOUS; SUBCUTANEOUS
Status: DISCONTINUED | OUTPATIENT
Start: 2023-12-16 | End: 2023-12-17

## 2023-12-16 RX ORDER — INSULIN DEGLUDEC 100 U/ML
15 INJECTION, SOLUTION SUBCUTANEOUS ONCE
Status: COMPLETED | OUTPATIENT
Start: 2023-12-16 | End: 2023-12-16

## 2023-12-16 RX ORDER — INSULIN DEGLUDEC 100 U/ML
35 INJECTION, SOLUTION SUBCUTANEOUS NIGHTLY
Status: DISCONTINUED | OUTPATIENT
Start: 2023-12-16 | End: 2023-12-17

## 2023-12-16 RX ORDER — INSULIN DEGLUDEC 100 U/ML
25 INJECTION, SOLUTION SUBCUTANEOUS EVERY 24 HOURS
Status: DISCONTINUED | OUTPATIENT
Start: 2023-12-16 | End: 2023-12-16

## 2023-12-16 RX ORDER — INSULIN DEGLUDEC 100 U/ML
35 INJECTION, SOLUTION SUBCUTANEOUS EVERY 24 HOURS
Status: DISCONTINUED | OUTPATIENT
Start: 2023-12-17 | End: 2023-12-16

## 2023-12-16 RX ADMIN — INSULIN LISPRO 8 UNITS: 100 INJECTION, SOLUTION INTRAVENOUS; SUBCUTANEOUS at 09:03

## 2023-12-16 RX ADMIN — SODIUM CHLORIDE 500 ML: 9 INJECTION, SOLUTION INTRAVENOUS at 22:15

## 2023-12-16 RX ADMIN — INSULIN LISPRO 6 UNITS: 100 INJECTION, SOLUTION INTRAVENOUS; SUBCUTANEOUS at 09:02

## 2023-12-16 RX ADMIN — SODIUM CHLORIDE 125 ML/HR: 9 INJECTION, SOLUTION INTRAVENOUS at 23:50

## 2023-12-16 RX ADMIN — INSULIN DEGLUDEC INJECTION 35 UNITS: 100 INJECTION, SOLUTION SUBCUTANEOUS at 18:15

## 2023-12-16 RX ADMIN — INSULIN DEGLUDEC INJECTION 15 UNITS: 100 INJECTION, SOLUTION SUBCUTANEOUS at 11:52

## 2023-12-16 RX ADMIN — INSULIN LISPRO 10 UNITS: 100 INJECTION, SOLUTION INTRAVENOUS; SUBCUTANEOUS at 17:07

## 2023-12-16 RX ADMIN — INSULIN LISPRO 10 UNITS: 100 INJECTION, SOLUTION INTRAVENOUS; SUBCUTANEOUS at 12:17

## 2023-12-16 RX ADMIN — INSULIN LISPRO 6 UNITS: 100 INJECTION, SOLUTION INTRAVENOUS; SUBCUTANEOUS at 17:06

## 2023-12-16 RX ADMIN — INSULIN LISPRO 8 UNITS: 100 INJECTION, SOLUTION INTRAVENOUS; SUBCUTANEOUS at 12:17

## 2023-12-16 ASSESSMENT — ENCOUNTER SYMPTOMS
UNEXPECTED WEIGHT CHANGE: 0
HEADACHES: 0
DIARRHEA: 0
VOMITING: 0
EYE PAIN: 0
BRUISES/BLEEDS EASILY: 0
SHORTNESS OF BREATH: 0
ABDOMINAL PAIN: 0
NAUSEA: 0
POLYPHAGIA: 0
ACTIVITY CHANGE: 0
POLYDIPSIA: 0
DIAPHORESIS: 0
FATIGUE: 1
APPETITE CHANGE: 0
AGITATION: 0
SORE THROAT: 0
CHEST TIGHTNESS: 0
PALPITATIONS: 0
FREQUENCY: 0
LIGHT-HEADEDNESS: 0
DIZZINESS: 0
TROUBLE SWALLOWING: 0
EYE REDNESS: 0
JOINT SWELLING: 0
NUMBNESS: 0
COUGH: 0
CONFUSION: 0
DYSURIA: 0

## 2023-12-16 ASSESSMENT — PAIN SCALES - GENERAL
PAINLEVEL_OUTOF10: 0 - NO PAIN

## 2023-12-16 NOTE — PROGRESS NOTES
Daily Progress Note  [facility] CLINICAL DECISION    Subjective  Brenda Lawton has been admitted to the CDU for a total of 28 hours for hyperglycemia. Serial assessments of the patient's clinical progress include:  Patient had an uneventful evening. On my evaluation patient stated that she is overall feeling well. She denies any near syncope, syncope, diaphoresis or symptoms of hypoglycemia since endocrinology add the additional 5 units at meal time to her current Lispro SSI #2 regiment.   Point of care glucose obtained last night prior to bedtime is still elevated at 281. Will repeat accu-check in AM to assess efficacy of additional lispro units.     Objective  PHYSICAL EXAM:   Vital signs reviewed and noted no distress. Afebrile.     General: Patient is conversant, well-appearing and well-nourished. NAD.     Head: Normocephalic, Atraumatic.     Eyes: PERRL, EOMI without scleral icterus. Conjunctiva clear.     ENMT: Hearing grossly intact. MMM. Posterior oropharynx unremarkable. Normal phonation, no stridor, drooling or trismus.     Neck: Supple, full ROM without lymphadenopathy.     Cardiac: Regular rate & rhythm. No murmur, gallops, rubs or extrasystole.     Pulmonary: CTAB with normal effort and good aeration throughout. No accessory muscle usage. No adventitious breath sounds.      Abdomen: Soft, non-tender, nonsurgical. No masses. No rebound, rigidity or guarding. Normoactive BS     : Deferred.     MSK: Full ROM intact. Symmetric muscle bulk without step-offs or gross deformity.     Vascular: Pulses full and equal. No cyanosis, clubbing or pitting LE edema. Capillary refill < 2s     Skin: WWP. Intact without rashes, lesions or discoloration. Turgor is good.     Neuro: CN II-XII intact. Awake, A&Ox3. Speech is fluent. No focal deficits noted.     Psychiatric: Mood and affect appropriate for situation.    Diagnostic Evaluation:      Labs    Results from last 72 hours   Lab Units  12/15/23  0541 12/14/23  2110   WBC AUTO x10*3/uL 10.4 11.4*   RBC AUTO x10*6/uL 4.04 4.70   HEMATOCRIT % 31.3* 36.9   MCV fL 78* 79*   MCH pg 24.5* 24.7*   RDW % 14.8* 15.1*   PLATELETS AUTO x10*3/uL 304 360   NEUTROS ABS x10*3/uL  --  7.39     Results from last 72 hours   Lab Units 12/15/23  0541 12/14/23  2110   GLUCOSE mg/dL 262* 379*   SODIUM mmol/L 139 137   POTASSIUM mmol/L 3.5 4.2   CHLORIDE mmol/L 102 98   CO2 mmol/L 25 26   ANION GAP mmol/L 16 17   BUN mg/dL 8 7   CREATININE mg/dL 0.36* 0.50   EGFR mL/min/1.73m*2 >90 >90   CALCIUM mg/dL 8.7 9.8   MAGNESIUM mg/dL  --  1.65   PHOSPHORUS mg/dL  --  3.5      Results from last 72 hours   Lab Units 12/14/23  2110   ALK PHOS U/L 124*   BILIRUBIN TOTAL mg/dL 0.4   PROTEIN TOTAL g/dL 8.0   ALT U/L 9   AST U/L 12           Results from last 72 hours   Lab Units 12/14/23  2110   TROPHS ng/L <3      Results from last 72 hours   Lab Units 12/15/23  2011 12/15/23  1535 12/15/23  1201 12/15/23  0541 12/15/23  0528 12/15/23  0421 12/15/23  0200 12/14/23  1926   POCT GLUCOSE mg/dL 281* 288* 306*  --  245* 246* 326* 402*   HEMOGLOBIN A1C %  --   --   --  13.0*  --   --   --   --       Results from last 72 hours   Lab Units 12/14/23 2110   POCT PH, VENOUS pH 7.37   POCT PCO2, VENOUS mm Hg 44   POCT PO2, VENOUS mm Hg 38   POCT SO2, VENOUS % 62   POCT HEMATOCRIT CALCULATED, VENOUS % 38.0   POCT SODIUM, VENOUS mmol/L 136   POCT POTASSIUM, VENOUS mmol/L 4.1   POCT CHLORIDE, VENOUS mmol/L 99   POCT IONIZED CALCIUM, VENOUS mmol/L 1.24   POCT GLUCOSE, VENOUS mg/dL 434*   POCT LACTATE, VENOUS mmol/L 1.1   POCT BASE EXCESS, VENOUS mmol/L -0.1   POCT HCO3 CALCULATED, VENOUS mmol/L 25.4   POCT HEMOGLOBIN, VENOUS g/dL 12.5   POCT ANION GAP, VENOUS mmol/L 16.0   FIO2 % 0       Urine  Results from last 72 hours   Lab Units 12/15/23  0132   COLOR U  Yellow   APPEARANCE U  Hazy*   PH U  6.0   PROTEIN U mg/dL 100 (2+)*   GLUCOSE U mg/dL >=500 (3+)*   BLOOD UR  LARGE (3+)*   KETONES UR mg/dL  80 (2+)*   BILIRUBIN U  NEGATIVE   UROBILINOGEN UR mg/dL <2.0   NITRITE U  NEGATIVE   LEUKOCYTES U  TRACE*      Results from last 72 hours   Lab Units 12/15/23  0132   WBC UR /HPF >50*   RBC UR HPF /HPF >20*   SQUAMEPI UR /HPF 1-9 (SPARSE)       Imaging  No orders to display       Medications:     Scheduled medications      - insulin degludec, 20 Units, subcutaneous, q24h  insulin lispro, 0-10 Units, subcutaneous, TID with meals  insulin lispro, 5 Units, subcutaneous, TID with meals         Continuous medications      -       PRN medications      - PRN medications: dextrose 10 % in water (D10W), dextrose, glucagon     Assessment & Plan  Brenda Lawton continues to be managed in accordance with the CDU clinical guidelines for hyperglycemia. An update of their clinical problem list included:     1) Hyperglycemia, Insulin Dependent DM2    -- Likely 2/2 to running out of home meds in setting of recent move    -- Hb A1c is 13.0, c/w uncontrolled DM2    -- Diabetic education complete    -- Continue diabetic diet    -- Per Endocrine: Continue Tresiba 20 units every day, add 5 units Lispro to current Lispro SSI #2    -- Serial glucose checks    -- MEDS to BEDS ordered    -- Endocrinology to follow-up peripherally    -- Re-assess mid-morning sugars, discharge home with close OP follow-up if glucose improved      Malena Anguiano PA-C  [facility]

## 2023-12-16 NOTE — PROGRESS NOTES
Brenda Lawton is a 23 y.o. female on day 0 of admission presenting with Hyperglycemia due to diabetes mellitus (CMS/[facility]).    Subjective   Pt seen and examined, reviewed home-going insulin plan.  Gave patient insulin titration parameters for long-acting insulin in case her glucose remains elevated in a.m.   Patient benefit from outpatient start of GLP-1  Patient agreeable to pharmacy platinum plan follow-up as well as  diabetes center follow-up.  Freestyle ale connected to clinic  I have reviewed histories, allergies and medications have been reviewed and there are no changes       Objective   Review of Systems   Constitutional:  Positive for fatigue. Negative for activity change, appetite change, diaphoresis and unexpected weight change.   HENT:  Negative for congestion, sore throat and trouble swallowing.    Eyes:  Negative for pain, redness and visual disturbance.   Respiratory:  Negative for cough, chest tightness and shortness of breath.    Cardiovascular:  Negative for chest pain, palpitations and leg swelling.   Gastrointestinal:  Negative for abdominal pain, diarrhea, nausea and vomiting.   Endocrine: Negative for cold intolerance, heat intolerance, polydipsia, polyphagia and polyuria.   Genitourinary:  Negative for dysuria, frequency and urgency.   Musculoskeletal:  Negative for gait problem and joint swelling.   Skin:  Negative for pallor and rash.   Allergic/Immunologic: Negative for immunocompromised state.   Neurological:  Negative for dizziness, light-headedness, numbness and headaches.   Hematological:  Does not bruise/bleed easily.   Psychiatric/Behavioral:  Negative for agitation, behavioral problems and confusion.    All other systems reviewed and are negative.    Physical Exam  Vitals reviewed.   Constitutional:       General: She is not in acute distress.     Appearance: Normal appearance. She is obese.   HENT:      Head: Normocephalic and atraumatic.      Nose: Nose normal.       "Mouth/Throat:      Mouth: Mucous membranes are moist.   Eyes:      Extraocular Movements: Extraocular movements intact.      Conjunctiva/sclera: Conjunctivae normal.      Pupils: Pupils are equal, round, and reactive to light.   Cardiovascular:      Pulses: Normal pulses.   Pulmonary:      Effort: Pulmonary effort is normal. No respiratory distress.   Abdominal:      General: Abdomen is flat. There is no distension.   Musculoskeletal:         General: Normal range of motion.   Skin:     General: Skin is warm and dry.      Findings: No rash.   Neurological:      Mental Status: She is alert and oriented to person, place, and time.   Psychiatric:         Mood and Affect: Mood normal.         Behavior: Behavior normal.         Last Recorded Vitals  Blood pressure 131/73, pulse 70, temperature 36.3 °C (97.3 °F), temperature source Tympanic, resp. rate 17, height 1.778 m (5' 10\"), weight 118 kg (260 lb), SpO2 99 %.  Intake/Output last 3 Shifts:  I/O last 3 completed shifts:  In: 1000 (8.5 mL/kg) [IV Piggyback:1000]  Out: - (0 mL/kg)   Weight: 117.9 kg     Relevant Results  Results from last 7 days   Lab Units 12/16/23  1155 12/16/23  0614 12/15/23  2011 12/15/23  1535 12/15/23  1201 12/15/23  0541 12/15/23  0200 12/14/23  2110   POCT GLUCOSE mg/dL 345* 283* 281* 288* 306*  --    < >  --    GLUCOSE mg/dL  --   --   --   --   --  262*  --  379*    < > = values in this interval not displayed.     Lab Review  Lab Results   Component Value Date    BILITOT 0.4 12/14/2023    CALCIUM 8.7 12/15/2023    CO2 25 12/15/2023     12/15/2023    CREATININE 0.36 (L) 12/15/2023    GLUCOSE 262 (H) 12/15/2023    ALKPHOS 124 (H) 12/14/2023    K 3.5 12/15/2023    PROT 8.0 12/14/2023     12/15/2023    AST 12 12/14/2023    ALT 9 12/14/2023    BUN 8 12/15/2023    ANIONGAP 16 12/15/2023    MG 1.65 12/14/2023    PHOS 3.5 12/14/2023    GGT 1,445 (H) 02/25/2019    ALBUMIN 3.9 12/14/2023    AMYLASE 17 (L) 02/28/2019    LIPASE 20 02/28/2019 "    GFRF >90 02/25/2023    GFRMALE CANCELED 02/24/2023     Lab Results   Component Value Date    TRIG 136 07/14/2021    CHOL 197 07/14/2021    HDL 42.7 07/14/2021     Lab Results   Component Value Date    HGBA1C 13.0 (H) 12/15/2023    HGBA1C CANCELED 02/22/2023    HGBA1C 12.8 (A) 02/22/2023     The ASCVD Risk score (Katiana BASSETT, et al., 2019) failed to calculate for the following reasons:    The 2019 ASCVD risk score is only valid for ages 40 to 79  Scheduled medications  insulin degludec, 25 Units, subcutaneous, q24h  insulin lispro, 0-10 Units, subcutaneous, TID with meals  insulin lispro, 10 Units, subcutaneous, TID with meals      Continuous medications     PRN medications  PRN medications: dextrose 10 % in water (D10W), dextrose, glucagon                 Assessment/Plan   Principal Problem:    Hyperglycemia due to diabetes mellitus (CMS/[facility])  Active Problems:    Uncontrolled type 2 diabetes mellitus with hyperglycemia (CMS/[facility])    Brenda Lawton is a 23 y.o. female presenting with history of insulin-dependent type 2 diabetes since age 13 who presented to the emergency department with 1 to 2 weeks of increased fatigue, polyuria, and polydipsia.     Patient reports has been moving a lot recently, is out of all of her insulin over the last 1 to 2 months.  Has not been checking her blood sugar either.  Reports increased fatigue over the last few weeks and states she feels dehydrated.  Does have a prior history of DKA requiring MICU admission earlier this year.     Patient states she tried calling her pharmacy for insulin refills, but was told she was out of refills and need to speak with the provider.  Tried to reach out to her previous provider and was unable to get a hold of them..     Diabetes History  Outpatient provider for endocrine care PCP, date of last visit 8/30/23 phone call   Initial diabetes diagnosis was made (year/age) July 2014 as DM2 by peds endo   Known complications due to diabetes include:  "hyperglycemia    PLAN  - Goal BG <180  - increase degludec 35u at bedtime, please give an additional 15u this AM        NPO or glucose trending <100mg/dL : reduce to 12u   - increase lispro 10u with meals plus scale        NPO or glucose <90mg/dL within 1hr before meal: hold   - continue lispro corrective scale #2 with meals   = 0u  151-200 = 2u  201-250 = 4u  251-300 = 6u  301-350 = 8u  351-400 = 10u     -Accuchecks (not BMP) TIDAC and QHS- kindly ensure QHS Accucheck is drawn; it is often missed   -Hypoglycemia protocol  -Diabetes Diet- low carb 60 CHO  -Will continue to follow and titrate insulin accordingly     Dispo: pt can expect to discharge home with basal-bolus insulin regimen and CGM. She would benefit from endocrinology follow up.      in terms of ordering supplies, to make it easy, if you go under discharge tab, and click discharge orders on the left, when you go to write discharge orders, if you click on the \"new\" button with the green plus sign, and click \"follow users\" you can search for Kaitlin España PA-C. All insulins and DM supplies saved so you can just select from that list if it is helpful!         HOME GOING RECS- please order the following from [email or web address]  - please order tresiba flex pen U-100 or toujeo solostar U-300 insulin pen \"inject 35 units under the skin once before bed\" 30 days = 5 pens (15mL of tresiba or 4.5mL of toujeo)  - please order insulin lispro U-100 insulin pen \"inject 10 units plus sliding scale three times daily with meals  = 0u, 151-200 = 2u, 201-250 = 4u, 251-300 = 6u, 301-350 = 8u, 351-400+ = 10u, use up to 40 units daily\" 30 days = 5 pens (15mL)  - please order insulin pen needles 32G 4mm for QID injections, 90 days = 400 pen needles  - please order glucose testing supplies for QID glucose measurement, 90 days = 1 glucose meter, 400 lancets and 400 strips  - please order alcohol swabs  - please order a form of glucagon rescue kit such as baqsimi or Gvoke " "hypopen  - please order ale 3 sensor, change every 14 days, 1 month = 2 sensors  **write in comment section on all supplies ordered: \"substitutions may be made by pharmacist depending on insurance coverage and what the pharmacy has available\"     -referring to clinical pharmacy for follow up, pt aware and agreeable to  [place] Plan Pharmacist follow-up  Pt would benefit from the following: type 2 dm insulin titration,  PAP, GLP-1 start   -Will follow up in post-hospital discharge clinic with Kaitlin España PA-C on 12/26 at 8am   [address] [facility]   [address] [place] [address]            I spent 50 minutes in the professional and overall care of this patient.      Kaitlin España PA-C    "

## 2023-12-17 ENCOUNTER — TELEPHONE (OUTPATIENT)
Dept: EMERGENCY MEDICINE | Facility: HOSPITAL | Age: 23
End: 2023-12-17

## 2023-12-17 VITALS
HEART RATE: 86 BPM | BODY MASS INDEX: 37.22 KG/M2 | RESPIRATION RATE: 18 BRPM | TEMPERATURE: 97.3 F | SYSTOLIC BLOOD PRESSURE: 120 MMHG | OXYGEN SATURATION: 99 % | DIASTOLIC BLOOD PRESSURE: 88 MMHG | HEIGHT: 70 IN | WEIGHT: 260 LBS

## 2023-12-17 LAB
GLUCOSE BLD MANUAL STRIP-MCNC: 276 MG/DL (ref 74–99)
GLUCOSE BLD MANUAL STRIP-MCNC: 277 MG/DL (ref 74–99)
GLUCOSE BLD MANUAL STRIP-MCNC: 280 MG/DL (ref 74–99)

## 2023-12-17 PROCEDURE — 82947 ASSAY GLUCOSE BLOOD QUANT: CPT | Mod: 59

## 2023-12-17 PROCEDURE — G0378 HOSPITAL OBSERVATION PER HR: HCPCS

## 2023-12-17 PROCEDURE — 99232 SBSQ HOSP IP/OBS MODERATE 35: CPT

## 2023-12-17 PROCEDURE — 2500000004 HC RX 250 GENERAL PHARMACY W/ HCPCS (ALT 636 FOR OP/ED): Mod: SE

## 2023-12-17 RX ORDER — INSULIN LISPRO 100 [IU]/ML
15 INJECTION, SOLUTION INTRAVENOUS; SUBCUTANEOUS
Qty: 1 EACH | Refills: 0 | Status: SHIPPED | OUTPATIENT
Start: 2023-12-17 | End: 2024-01-09 | Stop reason: SDUPTHER

## 2023-12-17 RX ORDER — INSULIN LISPRO 100 [IU]/ML
15 INJECTION, SOLUTION INTRAVENOUS; SUBCUTANEOUS
Status: DISCONTINUED | OUTPATIENT
Start: 2023-12-17 | End: 2023-12-17 | Stop reason: HOSPADM

## 2023-12-17 RX ORDER — INSULIN GLARGINE 100 [IU]/ML
45 INJECTION, SOLUTION SUBCUTANEOUS NIGHTLY
Qty: 1 EACH | Refills: 0 | Status: SHIPPED | OUTPATIENT
Start: 2023-12-17 | End: 2024-01-09 | Stop reason: SDUPTHER

## 2023-12-17 RX ORDER — INSULIN DEGLUDEC 100 U/ML
45 INJECTION, SOLUTION SUBCUTANEOUS NIGHTLY
Status: DISCONTINUED | OUTPATIENT
Start: 2023-12-17 | End: 2023-12-17 | Stop reason: HOSPADM

## 2023-12-17 RX ADMIN — INSULIN LISPRO 6 UNITS: 100 INJECTION, SOLUTION INTRAVENOUS; SUBCUTANEOUS at 07:50

## 2023-12-17 RX ADMIN — INSULIN LISPRO 6 UNITS: 100 INJECTION, SOLUTION INTRAVENOUS; SUBCUTANEOUS at 11:36

## 2023-12-17 RX ADMIN — INSULIN LISPRO 15 UNITS: 100 INJECTION, SOLUTION INTRAVENOUS; SUBCUTANEOUS at 11:36

## 2023-12-17 RX ADMIN — INSULIN LISPRO 15 UNITS: 100 INJECTION, SOLUTION INTRAVENOUS; SUBCUTANEOUS at 07:51

## 2023-12-17 ASSESSMENT — ENCOUNTER SYMPTOMS
ABDOMINAL PAIN: 0
DIARRHEA: 0
SHORTNESS OF BREATH: 0
CONFUSION: 0
BRUISES/BLEEDS EASILY: 0
APPETITE CHANGE: 0
HEADACHES: 0
TROUBLE SWALLOWING: 0
LIGHT-HEADEDNESS: 0
AGITATION: 0
EYE REDNESS: 0
PALPITATIONS: 0
CHEST TIGHTNESS: 0
FREQUENCY: 0
DIAPHORESIS: 0
JOINT SWELLING: 0
NUMBNESS: 0
ACTIVITY CHANGE: 0
DYSURIA: 0
EYE PAIN: 0
COUGH: 0
POLYPHAGIA: 0
DIZZINESS: 0
VOMITING: 0
UNEXPECTED WEIGHT CHANGE: 0
POLYDIPSIA: 0
FATIGUE: 1
SORE THROAT: 0
NAUSEA: 0

## 2023-12-17 ASSESSMENT — PAIN SCALES - GENERAL: PAINLEVEL_OUTOF10: 0 - NO PAIN

## 2023-12-17 ASSESSMENT — PAIN - FUNCTIONAL ASSESSMENT: PAIN_FUNCTIONAL_ASSESSMENT: 0-10

## 2023-12-17 NOTE — PROGRESS NOTES
Daksha Lawton is a 23 y.o. female on day 3 of admission presenting with Hyperglycemia due to diabetes mellitus (CMS/[facility]).   Serial assessments of clinical progress include:  1.) POCT BG @2338 290 given  Bolus, then 125ml/hr. Repeat POCT at 0610 277  2.) Patient remained hemodynamically stable and neurologically intact throughout the night.         Objective  VS reviewed  Physical Exam:  GENERAL:  The patient appears nourished and normally developed. Vital signs as documented.     Appearance: Alert, oriented, cooperative, in no acute distress. Well nourished & well hydrated.    HEENT:  Head normocephalic, atraumatic, EOMs intact, PERRLA, Mucous membranes moist. Nares patent without copious rhinorrhea.  Oropharynx moist and clear.  Uvula midline.    Neck: Supple.  No meningismus.  No swelling.  Trachea midline. No lymphadenopathy.    Pulmonary:  Lungs are clear to auscultation, without any respiratory distress.  No wheezing, crackles or rales.  No hypoxia or dyspnea.  Able to speak full sentences, no accessory muscle use.    Cardia:   Regular rate and rhythm. No murmurs, rubs or gallops.  No JVD.    GI:  Soft, non-distended, non-tender, BS positive x 4 quadrants, No rebound or guarding, no peritoneal signs, no CVA tenderness, no masses or organomegaly.    Musculoskeletal: Symmetrical muscle bulk.  No peripheral edema.  Pulses intact distal.  Able to walk.    Integumentary: Warm, dry and intact.  No pallor or jaundice.  No lesions, rashes or open sores.    NEURO:  No obvious neurological deficits, normal sensation and strength bilaterally.  Speech clear and fluent.  Able to follow commands, CN 2-12 intact.    Psych: Appropriate mood and affect.      Relevant Results  Results for orders placed or performed during the hospital encounter of 12/14/23 (from the past 24 hour(s))   POCT GLUCOSE   Result Value Ref Range    POCT Glucose 345 (H) 74 - 99 mg/dL   POCT GLUCOSE   Result Value Ref Range    POCT  Glucose 282 (H) 74 - 99 mg/dL   POCT GLUCOSE   Result Value Ref Range    POCT Glucose 309 (H) 74 - 99 mg/dL   POCT GLUCOSE   Result Value Ref Range    POCT Glucose 290 (H) 74 - 99 mg/dL   POCT GLUCOSE   Result Value Ref Range    POCT Glucose 277 (H) 74 - 99 mg/dL       Imaging Results  No results found.    Medications:  insulin degludec, 35 Units, subcutaneous, Nightly  insulin lispro, 0-10 Units, subcutaneous, TID with meals  insulin lispro, 10 Units, subcutaneous, TID with meals       PRN medications: dextrose 10 % in water (D10W), dextrose, glucagon     Assessment/Plan     Brenda Lawton continues to be managed in accordance with the [facility] clinical guidelines for Hyperglycemia. An update of their clinical problem list included:  1.) Hyperglycemia, Insulin Dependent DM2  -Endocrinology recommended  - Goal BG <180  - increase degludec 35u at bedtime, please give an additional 15u this AM        NPO or glucose trending <100mg/dL : reduce to 12u   - increase lispro 10u with meals plus scale        NPO or glucose <90mg/dL within 1hr before meal: hold   - continue lispro corrective scale #2 with meals   = 0u  151-200 = 2u  201-250 = 4u  251-300 = 6u  301-350 = 8u  351-400 = 10u     -Accuchecks (not BMP) TIDAC and QHS- kindly ensure QHS Accucheck is drawn; it is often missed   -Hypoglycemia protocol  -Diabetes Diet- low carb 60 CHO  -Will continue to follow and titrate insulin accordingly   Dispo: pt can expect to discharge home with basal-bolus insulin regimen and CGM. She would benefit from endocrinology follow up.       -Continue diabetic diet   -Endocrinology to follow-up peripherally    -Re-assess mid-morning sugars, discharge home with close OP follow-up if glucose improved.           We will observe the patient for the following endpoints:   1.) BG<250  2.) Stable vitals  3.)Diabetic education completed  4.) Meds to bed completed.     When met, appropriate disposition will be arranged.    Kellykristin GILBERTO Lawton  has been admitted to the CDU for 53 hours. I spent 25 minutes in the professional and overall care of this patient   @OBS@    RASHEEDA Verduzco-CNP  [facility]  Emergency Department  Extension [phone]

## 2023-12-17 NOTE — PROGRESS NOTES
Brenda Lawton is a 23 y.o. female on day 0 of admission presenting with Hyperglycemia due to diabetes mellitus (CMS/[facility]).    Subjective   Pt seen and examined, reviewed home-going insulin plan.  Gave patient insulin titration parameters for long-acting insulin in case her glucose remains elevated in a.m.   Patient benefit from outpatient start of GLP-1  Patient agreeable to pharmacy platinum plan follow-up as well as  diabetes center follow-up.  Freestyle ale connected to clinic  I have reviewed histories, allergies and medications have been reviewed and there are no changes       Objective   Review of Systems   Constitutional:  Positive for fatigue. Negative for activity change, appetite change, diaphoresis and unexpected weight change.   HENT:  Negative for congestion, sore throat and trouble swallowing.    Eyes:  Negative for pain, redness and visual disturbance.   Respiratory:  Negative for cough, chest tightness and shortness of breath.    Cardiovascular:  Negative for chest pain, palpitations and leg swelling.   Gastrointestinal:  Negative for abdominal pain, diarrhea, nausea and vomiting.   Endocrine: Negative for cold intolerance, heat intolerance, polydipsia, polyphagia and polyuria.   Genitourinary:  Negative for dysuria, frequency and urgency.   Musculoskeletal:  Negative for gait problem and joint swelling.   Skin:  Negative for pallor and rash.   Allergic/Immunologic: Negative for immunocompromised state.   Neurological:  Negative for dizziness, light-headedness, numbness and headaches.   Hematological:  Does not bruise/bleed easily.   Psychiatric/Behavioral:  Negative for agitation, behavioral problems and confusion.    All other systems reviewed and are negative.    Physical Exam  Vitals reviewed.   Constitutional:       General: She is not in acute distress.     Appearance: Normal appearance. She is obese.   HENT:      Head: Normocephalic and atraumatic.      Nose: Nose normal.       "Mouth/Throat:      Mouth: Mucous membranes are moist.   Eyes:      Extraocular Movements: Extraocular movements intact.      Conjunctiva/sclera: Conjunctivae normal.      Pupils: Pupils are equal, round, and reactive to light.   Cardiovascular:      Pulses: Normal pulses.   Pulmonary:      Effort: Pulmonary effort is normal. No respiratory distress.   Abdominal:      General: Abdomen is flat. There is no distension.   Musculoskeletal:         General: Normal range of motion.   Skin:     General: Skin is warm and dry.      Findings: No rash.   Neurological:      Mental Status: She is alert and oriented to person, place, and time.   Psychiatric:         Mood and Affect: Mood normal.         Behavior: Behavior normal.         Last Recorded Vitals  Blood pressure 120/88, pulse 86, temperature 36.3 °C (97.3 °F), temperature source Temporal, resp. rate 18, height 1.778 m (5' 10\"), weight 118 kg (260 lb), SpO2 99 %.  Intake/Output last 3 Shifts:  No intake/output data recorded.    Relevant Results  Results from last 7 days   Lab Units 12/17/23  1127 12/17/23  0748 12/17/23  0610 12/16/23  2338 12/16/23  2100 12/15/23  1201 12/15/23  0541 12/15/23  0200 12/14/23  2110   POCT GLUCOSE mg/dL 280* 276* 277* 290* 309*   < >  --    < >  --    GLUCOSE mg/dL  --   --   --   --   --   --  262*  --  379*    < > = values in this interval not displayed.       Lab Review  Lab Results   Component Value Date    BILITOT 0.4 12/14/2023    CALCIUM 8.7 12/15/2023    CO2 25 12/15/2023     12/15/2023    CREATININE 0.36 (L) 12/15/2023    GLUCOSE 262 (H) 12/15/2023    ALKPHOS 124 (H) 12/14/2023    K 3.5 12/15/2023    PROT 8.0 12/14/2023     12/15/2023    AST 12 12/14/2023    ALT 9 12/14/2023    BUN 8 12/15/2023    ANIONGAP 16 12/15/2023    MG 1.65 12/14/2023    PHOS 3.5 12/14/2023    GGT 1,445 (H) 02/25/2019    ALBUMIN 3.9 12/14/2023    AMYLASE 17 (L) 02/28/2019    LIPASE 20 02/28/2019    GFRF >90 02/25/2023    GFRMALE CANCELED " 02/24/2023     Lab Results   Component Value Date    TRIG 136 07/14/2021    CHOL 197 07/14/2021    HDL 42.7 07/14/2021     Lab Results   Component Value Date    HGBA1C 13.0 (H) 12/15/2023    HGBA1C CANCELED 02/22/2023    HGBA1C 12.8 (A) 02/22/2023     The ASCVD Risk score (Katiana BASSETT, et al., 2019) failed to calculate for the following reasons:    The 2019 ASCVD risk score is only valid for ages 40 to 79  Scheduled medications      Continuous medications    PRN medications                   Assessment/Plan   Principal Problem:    Hyperglycemia due to diabetes mellitus (CMS/[facility])  Active Problems:    Uncontrolled type 2 diabetes mellitus with hyperglycemia (CMS/[facility])    Brenda Lawton is a 23 y.o. female presenting with history of insulin-dependent type 2 diabetes since age 13 who presented to the emergency department with 1 to 2 weeks of increased fatigue, polyuria, and polydipsia.     Patient reports has been moving a lot recently, is out of all of her insulin over the last 1 to 2 months.  Has not been checking her blood sugar either.  Reports increased fatigue over the last few weeks and states she feels dehydrated.  Does have a prior history of DKA requiring MICU admission earlier this year.     Patient states she tried calling her pharmacy for insulin refills, but was told she was out of refills and need to speak with the provider.  Tried to reach out to her previous provider and was unable to get a hold of them..     Diabetes History  Outpatient provider for endocrine care PCP, date of last visit 8/30/23 phone call, has rpeviously seen Dr. Martinez, was on toujeo 100u daily  Initial diabetes diagnosis was made (year/age) July 2014 as DM2 by peds endo   Known complications due to diabetes include: hyperglycemia  Pt was unable to tolerate metformin d/t GI SE  Pt trialed trulicity for 2 months, did not continue but no SE    PLAN  - Goal BG <180  - increase tresiba 45u at bedtime (at home, if glucose is >300 for 3  "days in a row upon waking in AM, pt may increase by 10 units)        NPO or glucose trending <100mg/dL : reduce to 12u   - increase lispro 15u with meals plus scale        NPO or glucose <90mg/dL within 1hr before meal: hold   - continue lispro corrective scale #2 with meals   = 0u  151-200 = 2u  201-250 = 4u  251-300 = 6u  301-350 = 8u  351-400 = 10u     -Accuchecks (not BMP) TIDAC and QHS- kindly ensure QHS Accucheck is drawn; it is often missed   -Hypoglycemia protocol  -Diabetes Diet- low carb 60 CHO  -Will continue to follow and titrate insulin accordingly     Dispo: pt can expect to discharge home with current basal-bolus insulin regimen and CGM. She would benefit from endocrinology follow up.      in terms of ordering supplies, to make it easy, if you go under discharge tab, and click discharge orders on the left, when you go to write discharge orders, if you click on the \"new\" button with the green plus sign, and click \"follow users\" you can search for Kaitlin España PA-C. All insulins and DM supplies saved so you can just select from that list if it is helpful!         HOME GOING RECS- please order the following from [email or web address]  - please order tresiba flex pen U-100 or toujeo solostar U-300 insulin pen \"inject 45 units under the skin once before bed, if glucose >200 upon waking 3 days in a row, may increase by 10 units\" 30 days = 7 pens  - please order insulin lispro U-100 insulin pen \"inject 15 units plus sliding scale three times daily with meals  = 0u, 151-200 = 2u, 201-250 = 4u, 251-300 = 6u, 301-350 = 8u, 351-400+ = 10u, use up to 70 units daily\" 30 days = 7 pens  - please order insulin pen needles 32G 4mm for QID injections, 90 days = 400 pen needles  - please order glucose testing supplies for QID glucose measurement, 90 days = 1 glucose meter, 400 lancets and 400 strips  - please order alcohol swabs  - please order a form of glucagon rescue kit such as baqsimi or Gvoke hypopen  - please " "order ale 3 sensor, change every 14 days, 1 month = 2 sensors  **write in comment section on all supplies ordered: \"substitutions may be made by pharmacist depending on insurance coverage and what the pharmacy has available\"     -referring to clinical pharmacy for follow up, pt aware and agreeable to  [facility] Pharmacist follow-up  Pt would benefit from the following: type 2 dm insulin titration,  PAP, GLP-1 start  -Will follow up in post-hospital discharge clinic with Kaitlin España PA-C on 12/26 at 8am   [address] [facility]   [address] [place] [address]     12/17 note: requested additional insulin pens from [email or web address] due to increase in regimen, however pharmacy states unable to fill, will work with platinum plan team this week to ensure pt has refills       I spent 35 minutes in the professional and overall care of this patient.      Kaitlin España PA-C    "

## 2023-12-17 NOTE — PROGRESS NOTES
Disposition Note  [facility] CLINICAL DECISION  Patient: Brenda Lawton  MRN: [id number]  : 2000  Date of Evaluation: 2023        Limitations to history: None  Independent Historian: Yes  External Records Reviewed: N/A      Subjective:    Brenda Lawton is a 23 y.o. female with PMHx of insulin-dependent type 2 diabetes and an incident of DKA requiring MICU admission (23) [place] [facility] [place] for increased fatigue, polyuria, and polydipsia. She has undergone comprehensive diagnostic evaluation and therapeutic management in accordance with the CDU guidelines for hyperglycemia. Based on Ms. Lawton's clinical response and diagnostic information during this period of observation, it has been determined that the patient will be discharged.     Done in ED (23):   Subcutaneous lispro 5 units  IV LR's    Done in the CDU (12/15-):  Consult with Endocrinology  Insulin - degludec 45 nightly, lispro 15 TID w/ meals, glargine 45 daily bedtime (initial regimen was 35 units)    The acute evaluation included:  Orders Placed This Encounter   Procedures    Urine Culture    CBC and Auto Differential    Comprehensive Metabolic Panel    Troponin I, High Sensitivity    Blood Gas Venous Full Panel    Magnesium    Urinalysis with Reflex Microscopic and Culture    Phosphorus    Urinalysis with Reflex Microscopic and Culture    Extra Urine Gray Tube    Microscopic Only, Urine    CBC    Basic metabolic panel    Hemoglobin A1C    Referral to Endocrinology    Referral to Clinical Pharmacy    Adult diet Carb Controlled; 60 gram carb/meal, 30 gram Carb evening snack    Vital Signs    Activity (specify) No Restrictions    Notify provider (specify parameters)    Notify provider (specify parameters)    Notify provider (specify parameters)    Glucose 10-70 mg/dL & CONSCIOUS- Give 15 Grams of Carbohydrates and repeat until blood glucose level reaches 100 mg/dL or greater.    Notify  provider (specify parameters)    Notify provider (specify parameters)    Notify provider (specify parameters)    Do NOT give corective scale insulin at bedtime without calling Provider.    Add Corrective scale insulin dose to scheduled pre-meal insulin, if ordered.    Notify provider (specify parameters)    Notify provider (specify parameters)    Notify provider (specify parameters)    Notify provider (specify parameters)    Notify provider (specify parameters)    Notify provider (specify parameters)    Glucose 10-70 mg/dL & CONSCIOUS- Give 15 Grams of Carbohydrates and repeat until blood glucose level reaches 100 mg/dL or greater.    Notify provider (specify parameters)    Notify provider (specify parameters)    Notify provider (specify parameters)    Notify provider (specify parameters)    Notify provider (specify parameters)    Notify provider (specify parameters)    Glucose 10-70 mg/dL & CONSCIOUS- Give 15 Grams of Carbohydrates and repeat until blood glucose level reaches 100 mg/dL or greater.    Notify provider (specify parameters)    Notify provider (specify parameters)    Notify provider (specify parameters)    Notify provider (specify parameters)    Notify provider (specify parameters)    Notify provider (specify parameters)    Glucose 10-70 mg/dL & CONSCIOUS- Give 15 Grams of Carbohydrates and repeat until blood glucose level reaches 100 mg/dL or greater.    Notify provider (specify parameters)    Notify provider (specify parameters)    Notify provider (specify parameters)    Notify provider (specify parameters)    Notify provider (specify parameters)    Notify provider (specify parameters)    Glucose 10-70 mg/dL & CONSCIOUS- Give 15 Grams of Carbohydrates and repeat until blood glucose level reaches 100 mg/dL or greater.    Notify provider (specify parameters)    Notify provider (specify parameters)    Notify provider (specify parameters)    Full code    Inpatient consult to Endocrinology    Inpatient  consult to Diabetes educator    POCT pregnancy, urine manually resulted    POCT GLUCOSE    POCT GLUCOSE    POCT GLUCOSE    POCT GLUCOSE    POCT GLUCOSE    POCT GLUCOSE    POCT GLUCOSE    POCT GLUCOSE    POCT GLUCOSE    POCT GLUCOSE    POCT GLUCOSE    POCT GLUCOSE    POCT GLUCOSE    POCT GLUCOSE    POCT GLUCOSE    POCT GLUCOSE    POCT GLUCOSE    POCT GLUCOSE    POCT GLUCOSE    Electrocardiogram, 12-lead PRN ACS symptoms    Insert and maintain peripheral IV    Send to CDU    Initiate observation status         Placed in observation at: 12/14       Past History     Past Medical History:   Diagnosis Date    Type 2 diabetes mellitus with ketoacidosis without coma (CMS/[facility]) 01/12/2016    Diabetic ketoacidosis in pediatric patient     Past Surgical History:   Procedure Laterality Date    OTHER SURGICAL HISTORY  03/19/2019    Cholecystectomy laparoscopic     Social History     Socioeconomic History    Marital status: Single     Spouse name: Not on file    Number of children: Not on file    Years of education: Not on file    Highest education level: Not on file   Occupational History    Not on file   Tobacco Use    Smoking status: Not on file    Smokeless tobacco: Not on file   Substance and Sexual Activity    Alcohol use: Not on file    Drug use: Not on file    Sexual activity: Not on file   Other Topics Concern    Not on file   Social History Narrative    Not on file     Social Determinants of Health     Financial Resource Strain: Not on file   Food Insecurity: Not on file   Transportation Needs: Not on file   Physical Activity: Not on file   Stress: Not on file   Social Connections: Not on file   Intimate Partner Violence: Not on file   Housing Stability: Not on file         Medications/Allergies     Previous Medications    BLOOD-GLUCOSE SENSOR DEVICE    USE AS DIRECTED TO CONTINUOUS GLUCOSE MONITORING. CHANGE EVERY 10 DAYS    DEXCOM G4 PLATINUM TRANSMITTER DEVICE    FOR CONTINUOUS GLUCOSE MONITORING, CHANGE EVERY 90  DAYS.    DOXYCYCLINE (VIBRAMYCIN) 100 MG CAPSULE    TAKE 1 CAPSULE BY MOUTH TWO TIMES A DAY    INSULIN GLARGINE (TOUJEO MAX SOLOSTAR, 2 UNIT DIAL,) 300 UNIT/ML (3 ML) INJECTION    INJECT 100 UNITS UNDER THE SKIN AT BEDTIME     No Known Allergies      Review of Systems  All systems reviewed and otherwise negative, except as stated above in HPI.    Diagnostics reviewed by Topher Lam     Labs:  Results for orders placed or performed during the hospital encounter of 12/14/23   Urine Culture    Specimen: Clean Catch/Voided; Urine   Result Value Ref Range    Urine Culture No significant growth    CBC and Auto Differential   Result Value Ref Range    WBC 11.4 (H) 4.4 - 11.3 x10*3/uL    nRBC 0.0 0.0 - 0.0 /100 WBCs    RBC 4.70 4.00 - 5.20 x10*6/uL    Hemoglobin 11.6 (L) 12.0 - 16.0 g/dL    Hematocrit 36.9 36.0 - 46.0 %    MCV 79 (L) 80 - 100 fL    MCH 24.7 (L) 26.0 - 34.0 pg    MCHC 31.4 (L) 32.0 - 36.0 g/dL    RDW 15.1 (H) 11.5 - 14.5 %    Platelets 360 150 - 450 x10*3/uL    Neutrophils % 64.9 40.0 - 80.0 %    Immature Granulocytes %, Automated 0.3 0.0 - 0.9 %    Lymphocytes % 26.4 13.0 - 44.0 %    Monocytes % 7.0 2.0 - 10.0 %    Eosinophils % 1.0 0.0 - 6.0 %    Basophils % 0.4 0.0 - 2.0 %    Neutrophils Absolute 7.39 1.20 - 7.70 x10*3/uL    Immature Granulocytes Absolute, Automated 0.03 0.00 - 0.70 x10*3/uL    Lymphocytes Absolute 3.01 1.20 - 4.80 x10*3/uL    Monocytes Absolute 0.80 0.10 - 1.00 x10*3/uL    Eosinophils Absolute 0.11 0.00 - 0.70 x10*3/uL    Basophils Absolute 0.04 0.00 - 0.10 x10*3/uL   Comprehensive Metabolic Panel   Result Value Ref Range    Glucose 379 (H) 74 - 99 mg/dL    Sodium 137 136 - 145 mmol/L    Potassium 4.2 3.5 - 5.3 mmol/L    Chloride 98 98 - 107 mmol/L    Bicarbonate 26 21 - 32 mmol/L    Anion Gap 17 10 - 20 mmol/L    Urea Nitrogen 7 6 - 23 mg/dL    Creatinine 0.50 0.50 - 1.05 mg/dL    eGFR >90 >60 mL/min/1.73m*2    Calcium 9.8 8.6 - 10.6 mg/dL    Albumin 3.9 3.4 - 5.0 g/dL    Alkaline  Phosphatase 124 (H) 33 - 110 U/L    Total Protein 8.0 6.4 - 8.2 g/dL    AST 12 9 - 39 U/L    Bilirubin, Total 0.4 0.0 - 1.2 mg/dL    ALT 9 7 - 45 U/L   Troponin I, High Sensitivity   Result Value Ref Range    Troponin I, High Sensitivity <3 0 - 34 ng/L   Blood Gas Venous Full Panel   Result Value Ref Range    POCT pH, Venous 7.37 7.33 - 7.43 pH    POCT pCO2, Venous 44 41 - 51 mm Hg    POCT pO2, Venous 38 35 - 45 mm Hg    POCT SO2, Venous 62 45 - 75 %    POCT Oxy Hemoglobin, Venous 60.6 45.0 - 75.0 %    POCT Hematocrit Calculated, Venous 38.0 36.0 - 46.0 %    POCT Sodium, Venous 136 136 - 145 mmol/L    POCT Potassium, Venous 4.1 3.5 - 5.3 mmol/L    POCT Chloride, Venous 99 98 - 107 mmol/L    POCT Ionized Calicum, Venous 1.24 1.10 - 1.33 mmol/L    POCT Glucose, Venous 434 (H) 74 - 99 mg/dL    POCT Lactate, Venous 1.1 0.4 - 2.0 mmol/L    POCT Base Excess, Venous -0.1 -2.0 - 3.0 mmol/L    POCT HCO3 Calculated, Venous 25.4 22.0 - 26.0 mmol/L    POCT Hemoglobin, Venous 12.5 12.0 - 16.0 g/dL    POCT Anion Gap, Venous 16.0 10.0 - 25.0 mmol/L    Patient Temperature 37.0 degrees Celsius    FiO2 0 %   Magnesium   Result Value Ref Range    Magnesium 1.65 1.60 - 2.40 mg/dL   Phosphorus   Result Value Ref Range    Phosphorus 3.5 2.5 - 4.9 mg/dL   Urinalysis with Reflex Microscopic and Culture   Result Value Ref Range    Color, Urine Yellow Straw, Yellow    Appearance, Urine Hazy (N) Clear    Specific Gravity, Urine 1.039 (N) 1.005 - 1.035    pH, Urine 6.0 5.0, 5.5, 6.0, 6.5, 7.0, 7.5, 8.0    Protein, Urine 100 (2+) (N) NEGATIVE mg/dL    Glucose, Urine >=500 (3+) (A) NEGATIVE mg/dL    Blood, Urine LARGE (3+) (A) NEGATIVE    Ketones, Urine 80 (2+) (A) NEGATIVE mg/dL    Bilirubin, Urine NEGATIVE NEGATIVE    Urobilinogen, Urine <2.0 <2.0 mg/dL    Nitrite, Urine NEGATIVE NEGATIVE    Leukocyte Esterase, Urine TRACE (A) NEGATIVE   Extra Urine Gray Tube   Result Value Ref Range    Extra Tube Hold for add-ons.    Microscopic Only, Urine    Result Value Ref Range    WBC, Urine >50 (A) 1-5, NONE /HPF    RBC, Urine >20 (A) NONE, 1-2, 3-5 /HPF    Squamous Epithelial Cells, Urine 1-9 (SPARSE) Reference range not established. /HPF    Bacteria, Urine 1+ (A) NONE SEEN /HPF   CBC   Result Value Ref Range    WBC 10.4 4.4 - 11.3 x10*3/uL    nRBC 0.0 0.0 - 0.0 /100 WBCs    RBC 4.04 4.00 - 5.20 x10*6/uL    Hemoglobin 9.9 (L) 12.0 - 16.0 g/dL    Hematocrit 31.3 (L) 36.0 - 46.0 %    MCV 78 (L) 80 - 100 fL    MCH 24.5 (L) 26.0 - 34.0 pg    MCHC 31.6 (L) 32.0 - 36.0 g/dL    RDW 14.8 (H) 11.5 - 14.5 %    Platelets 304 150 - 450 x10*3/uL   Basic metabolic panel   Result Value Ref Range    Glucose 262 (H) 74 - 99 mg/dL    Sodium 139 136 - 145 mmol/L    Potassium 3.5 3.5 - 5.3 mmol/L    Chloride 102 98 - 107 mmol/L    Bicarbonate 25 21 - 32 mmol/L    Anion Gap 16 10 - 20 mmol/L    Urea Nitrogen 8 6 - 23 mg/dL    Creatinine 0.36 (L) 0.50 - 1.05 mg/dL    eGFR >90 >60 mL/min/1.73m*2    Calcium 8.7 8.6 - 10.6 mg/dL   Hemoglobin A1C   Result Value Ref Range    Hemoglobin A1C 13.0 (H) see below %    Estimated Average Glucose 326 Not Established mg/dL   POCT GLUCOSE   Result Value Ref Range    POCT Glucose 402 (H) 74 - 99 mg/dL   POCT GLUCOSE   Result Value Ref Range    POCT Glucose 326 (H) 74 - 99 mg/dL   POCT GLUCOSE   Result Value Ref Range    POCT Glucose 246 (H) 74 - 99 mg/dL   POCT GLUCOSE   Result Value Ref Range    POCT Glucose 245 (H) 74 - 99 mg/dL   POCT GLUCOSE   Result Value Ref Range    POCT Glucose 306 (H) 74 - 99 mg/dL   POCT GLUCOSE   Result Value Ref Range    POCT Glucose 288 (H) 74 - 99 mg/dL   POCT GLUCOSE   Result Value Ref Range    POCT Glucose 281 (H) 74 - 99 mg/dL   POCT GLUCOSE   Result Value Ref Range    POCT Glucose 283 (H) 74 - 99 mg/dL   POCT GLUCOSE   Result Value Ref Range    POCT Glucose 345 (H) 74 - 99 mg/dL   POCT GLUCOSE   Result Value Ref Range    POCT Glucose 282 (H) 74 - 99 mg/dL   POCT GLUCOSE   Result Value Ref Range    POCT Glucose 309  "(H) 74 - 99 mg/dL   POCT GLUCOSE   Result Value Ref Range    POCT Glucose 290 (H) 74 - 99 mg/dL   POCT GLUCOSE   Result Value Ref Range    POCT Glucose 277 (H) 74 - 99 mg/dL   POCT GLUCOSE   Result Value Ref Range    POCT Glucose 276 (H) 74 - 99 mg/dL   POCT GLUCOSE   Result Value Ref Range    POCT Glucose 280 (H) 74 - 99 mg/dL     Radiographs:  No orders to display           Physical Exam     Visit Vitals  /88 (Patient Position: Lying)   Pulse 86   Temp 36.3 °C (97.3 °F) (Temporal)   Resp 18   Ht 1.778 m (5' 10\")   Wt 118 kg (260 lb)   SpO2 99%   BMI 37.31 kg/m²   BSA 2.41 m²       GENERAL:  The patient appears nourished and normally developed. Vital signs as documented.   PULMONARY:  Lungs are clear to auscultation, without any respiratory distress. Able to speak full sentences, no accessory muscle use  CARDIAC:   Normal rate. No murmurs, rubs or gallops  SKIN:   Good color, with no significant rashes.  No pallor.  Psych: Appropriate mood and affect  EYES: PERRLA, EOM intact   GI: abdomen soft with bowel sounds present in all 4 quads     Consultants  1) Endocrinology      Impression and Plan    In summary, Brenda Lawton has been cared for according to the standard [facility] Clinical Decision Unit observation protocol for Hyperglycemia due to diabetes mellitus (CMS/[facility]). This extended period of observation was specifically required to determine the need for hospitalization. Prior to discharge from observation, the final physical exam is documented above.     Final MDM:  Diabetic education completed - Ms. Lawton was counseled by endocrinology on increasing her Lantus by 10 units if she does not see her levels drop <200 after 3 days.   Diabetes diet - low carb 60 CHO  Meds to beds was completed yesterday. Attempted to have additional pens delivered to her from pharmacy but due to her insurance, we were not able to provide this to her.   She has a close follow up appointment with " pharmacy and endocrine in the next week or two       Based on the patient's condition and test results, the patient will be discharged.     Total length of observation was 66 hours. MAGUE Liao is the CDU disposition FANNY.      Discharge Diagnosis  Hyperglycemia due to diabetes mellitus (CMS/[facility])    Issues Requiring Follow-Up  Type two diabetes and management     Discharge Meds     Your medication list        START taking these medications        Instructions Last Dose Given Next Dose Due   Easy Touch Alcohol Prep Pads pads, medicated  Generic drug: alcohol swabs      Apply 1 each topically 4 times a day. Use prior to checking glucose or injecting insulin       Gvoke HypoPen 1-Pack 1 mg/0.2 mL auto-injector  Generic drug: glucagon      Inject 0.2 mL under the skin 1 time if needed (low blood sugar) for up to 1 dose.       HumaLOG U-100 Insulin 100 unit/mL injection  Generic drug: insulin lispro      inject 5 units plus sliding scale three times daily with meals  = 0u, 151-200 = 2u, 201-250 = 4u, 251-300 = 6u, 301-350 = 8u, 351-400+ = 10u, use up to 40 units daily       insulin lispro 100 unit/mL injection  Commonly known as: HumaLOG      Inject 15 Units under the skin 3 times a day with meals. Take as directed per insulin instructions.       True Metrix Glucose Meter misc  Generic drug: blood-glucose meter      1 each 4 times a day. Use to check glucose 4 times daily, before meals and at bedtime       True Metrix Glucose Test Strip strip  Generic drug: blood sugar diagnostic      1 strip 4 times a day. Use to check glucose 4 times daily, before meals and at bedtime       Blood Glucose Test strip  Generic drug: blood sugar diagnostic      1 each 4 times a day. Use to check glucose 4 times daily, before meals and at bedtime       TRUEplus Lancets 30 gauge misc  Generic drug: lancets      1 each 4 times a day.              CHANGE how you take these medications        Instructions Last Dose Given Next  Dose Due   Dexcom G6 Sensor device  Generic drug: blood-glucose sensor  What changed: Another medication with the same name was added. Make sure you understand how and when to take each.      USE AS DIRECTED TO CONTINUOUS GLUCOSE MONITORING. CHANGE EVERY 10 DAYS       FreeStyle Prudencio 3 Sensor device  Generic drug: blood-glucose sensor  What changed: You were already taking a medication with the same name, and this prescription was added. Make sure you understand how and when to take each.      1 kit every 14 (fourteen) days. Change sensor every 14 days       Toujeo Max U-300 SoloStar 300 unit/mL (3 mL) injection  Generic drug: insulin glargine  What changed: Another medication with the same name was added. Make sure you understand how and when to take each.      INJECT 100 UNITS UNDER THE SKIN AT BEDTIME       insulin glargine 100 unit/mL (3 mL) pen  Commonly known as: Lantus Solostar U-100 Insulin  What changed: You were already taking a medication with the same name, and this prescription was added. Make sure you understand how and when to take each.      Inject 45 Units under the skin once daily at bedtime. Take as directed per insulin instructions.              CONTINUE taking these medications        Instructions Last Dose Given Next Dose Due   Dexcom G6 Transmitter device  Generic drug: Dexcom G4 platinum transmitter      FOR CONTINUOUS GLUCOSE MONITORING, CHANGE EVERY 90 DAYS.              ASK your doctor about these medications        Instructions Last Dose Given Next Dose Due   doxycycline 100 mg capsule  Commonly known as: Vibramycin      TAKE 1 CAPSULE BY MOUTH TWO TIMES A DAY                 Where to Get Your Medications        These medications were sent to [facility]  [address], [address], [place] [address]      Hours: 8AM to 6PM Mon-Fri, 8AM to 4PM Sat, 9AM to 1PM Sun Phone: [phone]   Blood Glucose Test strip  Easy Touch Alcohol Prep Pads pads, medicated  FreeStyle  Prudencio 3 Sensor device  Gvoke HypoPen 1-Pack 1 mg/0.2 mL auto-injector  HumaLOG U-100 Insulin 100 unit/mL injection  insulin glargine 100 unit/mL (3 mL) pen  insulin lispro 100 unit/mL injection  True Metrix Glucose Meter misc  True Metrix Glucose Test Strip strip  TRUEplus Lancets 30 gauge misc         Test Results Pending At Discharge  Pending Labs       No current pending labs.              Outpatient Follow-Up  Future Appointments   Date Time Provider Department Center   12/26/2023  8:00 AM Kaitlin España PA-C [id number]    I was present with the PA student who participated in the documentation of this note.  I have personally seen and re-examined the patient and performed the medical decision-making components (assessment and plan of care). I have reviewed the PA student documentation and verified the findings in the note as written with additions or exceptions as stated in the body of this note.    RASHEEDA Lindsay

## 2023-12-18 ENCOUNTER — PHARMACY VISIT (OUTPATIENT)
Dept: PHARMACY | Facility: CLINIC | Age: 23
End: 2023-12-18
Payer: MEDICAID

## 2023-12-18 PROCEDURE — RXMED WILLOW AMBULATORY MEDICATION CHARGE

## 2023-12-18 RX ORDER — PEN NEEDLE, DIABETIC 30 GX3/16"
1 NEEDLE, DISPOSABLE MISCELLANEOUS 4 TIMES DAILY
Qty: 400 EACH | Refills: 0 | Status: SHIPPED | OUTPATIENT
Start: 2023-12-18 | End: 2024-03-28

## 2023-12-19 ENCOUNTER — PHARMACY VISIT (OUTPATIENT)
Dept: PHARMACY | Facility: CLINIC | Age: 23
End: 2023-12-19

## 2023-12-27 ENCOUNTER — TELEMEDICINE (OUTPATIENT)
Dept: PHARMACY | Facility: HOSPITAL | Age: 23
End: 2023-12-27
Payer: COMMERCIAL

## 2023-12-27 DIAGNOSIS — E11.9 TYPE 2 DIABETES MELLITUS WITHOUT COMPLICATION, WITHOUT LONG-TERM CURRENT USE OF INSULIN (MULTI): Primary | ICD-10-CM

## 2023-12-27 PROCEDURE — RXMED WILLOW AMBULATORY MEDICATION CHARGE

## 2023-12-27 RX ORDER — DULAGLUTIDE 0.75 MG/.5ML
0.75 INJECTION, SOLUTION SUBCUTANEOUS
Qty: 2 ML | Refills: 1 | Status: SHIPPED | OUTPATIENT
Start: 2023-12-27

## 2023-12-27 NOTE — ASSESSMENT & PLAN NOTE
Patient is currently uncontrolled with A1C of 13% (goal< 7%).  Patient has started to use the Freestyle prudencio 3 sensors and states that he blood sugars have been around 200 over the last week.  Discussed starting Trulicity and patient stated that she has tried the medication before but was not sure why she stopped. Patient was agreeable to restart Trulicity.      Trulicity Education:     - Counseled patient on Trulicity MOA, expectations, side effects, duration of therapy, administration, and monitoring parameters.  - Provided detailed dosing and administration counseling to ensure proper technique.   - Reviewed Trulicity titration schedule, starting with 0.75 mg once weekly for 4 weeks, then increase to 1.5 mg once weekly. Pt verbalized understanding.  - Counseled patient on the benefits of GLP-1ra, such as cardiovascular risk reduction, glycemic control, and weight loss potential.  - Reviewed storage requirements of Trulicity when not in use, and when to administer the medication if a dose is missed.  - Advised patient that they may experience improved satiety after meals and portion sizes of meals may be reduced as doses of Trulicity increase.  - Counseled patient to avoid foods that are fatty/oily as this may precipitate the nausea/GI upset that may occur with new start Trulicity.     PLAN:  - START Trulicity 0.75 mg once weekly injection.  Prescription sent to [facility] to be mailed to patient  - Continue Lantus and Humalog as previously prescribed.    - Continue to use Freestyle Prudencio 3 to monitor blood sugar   - Schedule appointment with Endocrinology    Follow up: 4 weeks

## 2023-12-27 NOTE — PROGRESS NOTES
Pharmacy Post-Discharge Visit  Brenda Lawton is a 23 y.o. female was referred to Clinical Pharmacy Team to complete a post-discharge medication optimization and monitoring visit.  The patient was referred for their Diabetes.    Admission Date: 12/14/2023  Discharge Date: 12/17/2023    Referring Provider: Kaitlin España PA-C    Subjective   No Known Allergies    [facility]  [address], [address]  [facility] [address]  Phone: [phone] Fax: [phone]      Social History     Social History Narrative    Not on file        Notable Medication changes following discharge:  Start: Lantus  Stop: Toujeo  Change: Humalog    Diabetes  She presents for her initial diabetic visit. She has type 2 diabetes mellitus. There are no hypoglycemic associated symptoms. There are no hypoglycemic complications. She participates in exercise intermittently. Her overall blood glucose range is >200 mg/dl. An ACE inhibitor/angiotensin II receptor blocker is not being taken.     Review of Systems    Medication System Management:  Affordability/Accessibility: None  Adherence/Organization: None  Adverse Effects: None    Objective     There were no vitals taken for this visit.     LAB  Lab Results   Component Value Date    BILITOT 0.4 12/14/2023    CALCIUM 8.7 12/15/2023    CO2 25 12/15/2023     12/15/2023    CREATININE 0.36 (L) 12/15/2023    GLUCOSE 262 (H) 12/15/2023    ALKPHOS 124 (H) 12/14/2023    K 3.5 12/15/2023    PROT 8.0 12/14/2023     12/15/2023    AST 12 12/14/2023    ALT 9 12/14/2023    BUN 8 12/15/2023    ANIONGAP 16 12/15/2023    MG 1.65 12/14/2023    PHOS 3.5 12/14/2023    GGT 1,445 (H) 02/25/2019    ALBUMIN 3.9 12/14/2023    AMYLASE 17 (L) 02/28/2019    LIPASE 20 02/28/2019    GFRF >90 02/25/2023    GFRMALE CANCELED 02/24/2023     Lab Results   Component Value Date    TRIG 136 07/14/2021    CHOL 197 07/14/2021    HDL 42.7 07/14/2021     Lab Results   Component Value Date    HGBA1C  13.0 (H) 12/15/2023       Current Outpatient Medications on File Prior to Visit   Medication Sig Dispense Refill    blood-glucose sensor (FreeStyle Prudencio 3 Sensor) device 1 kit every 14 (fourteen) days. Change sensor every 14 days 2 each 0    glucagon (Gvoke HypoPen 1-Pack) 1 mg/0.2 mL auto-injector Inject 0.2 mL under the skin 1 time if needed (low blood sugar) for up to 1 dose. 0.2 mL 0    insulin lispro (HumaLOG) 100 unit/mL injection inject 5 units three times daily with meals plus sliding scale:  = 0u, 151-200 = 2u, 201-250 = 4u, 251-300 = 6u, 301-350 = 8u, 351-400+ = 10u, use up to 40 units daily 15 mL 0    alcohol swabs pads, medicated Apply 1 each topically 4 times a day. Use prior to checking glucose or injecting insulin 400 each 0    blood sugar diagnostic (Blood Glucose Test) strip 1 strip 4 times a day. Use to check glucose 4 times daily, before meals and at bedtime 400 strip 0    blood sugar diagnostic (Blood Glucose Test) strip 1 each 4 times a day. Use to check glucose 4 times daily, before meals and at bedtime 400 strip 0    blood-glucose meter misc 1 each 4 times a day. Use to check glucose 4 times daily, before meals and at bedtime 1 each 0    blood-glucose sensor device USE AS DIRECTED TO CONTINUOUS GLUCOSE MONITORING. CHANGE EVERY 10 DAYS 9 each 3    Dexcom G4 platinum transmitter device FOR CONTINUOUS GLUCOSE MONITORING, CHANGE EVERY 90 DAYS. 1 each 3    doxycycline (Vibramycin) 100 mg capsule TAKE 1 CAPSULE BY MOUTH TWO TIMES A DAY 14 capsule 0    insulin glargine (Lantus Solostar U-100 Insulin) 100 unit/mL (3 mL) pen Inject 45 Units under the skin once daily at bedtime. Take as directed per insulin instructions. 1 each 0    insulin glargine (Toujeo Max Solostar, 2 unit dial,) 300 unit/mL (3 mL) injection INJECT 100 UNITS UNDER THE SKIN AT BEDTIME (Patient not taking: Reported on 12/27/2023) 45 mL 0    insulin lispro (HumaLOG) 100 unit/mL injection Inject 15 Units under the skin 3 times a  "day with meals. Take as directed per insulin instructions. (Patient not taking: Reported on 12/27/2023) 1 each 0    lancets 30 gauge misc 1 each 4 times a day. 400 each 0    pen needle, diabetic 32 gauge x 5/32\" needle 1 each 4 times a day. Use to inject insulin 4 times daily 400 each 0     No current facility-administered medications on file prior to visit.        HISTORICAL PHARMACOTHERAPY  -Metformin - constipation and stomach cramps   - Trulicity- unknown     DRUG INTERATIONS  - None     Assessment/Plan   Problem List Items Addressed This Visit       Type 2 diabetes mellitus without complication, without long-term current use of insulin (CMS/[facility]) - Primary     Patient is currently uncontrolled with A1C of 13% (goal< 7%).  Patient has started to use the Freestyle ale 3 sensors and states that he blood sugars have been around 200 over the last week.  Discussed starting Trulicity and patient stated that she has tried the medication before but was not sure why she stopped. Patient was agreeable to restart Trulicity.      Trulicity Education:     - Counseled patient on Trulicity MOA, expectations, side effects, duration of therapy, administration, and monitoring parameters.  - Provided detailed dosing and administration counseling to ensure proper technique.   - Reviewed Trulicity titration schedule, starting with 0.75 mg once weekly for 4 weeks, then increase to 1.5 mg once weekly. Pt verbalized understanding.  - Counseled patient on the benefits of GLP-1ra, such as cardiovascular risk reduction, glycemic control, and weight loss potential.  - Reviewed storage requirements of Trulicity when not in use, and when to administer the medication if a dose is missed.  - Advised patient that they may experience improved satiety after meals and portion sizes of meals may be reduced as doses of Trulicity increase.  - Counseled patient to avoid foods that are fatty/oily as this may precipitate the nausea/GI upset that may " occur with new start Trulicity.     PLAN:  - START Trulicity 0.75 mg once weekly injection.  Prescription sent to [facility] to be mailed to patient  - Continue Lantus and Humalog as previously prescribed.    - Continue to use Freestyle Prudencio 3 to monitor blood sugar   - Schedule appointment with Endocrinology    Follow up: 4 weeks           Relevant Medications    dulaglutide (Trulicity) 0.75 mg/0.5 mL pen injector       Continue all meds under the continuation of care with the referring provider and clinical pharmacy team.    Eveline Salazar, PharmD     Verbal consent to manage patient's drug therapy was obtained from the patient. They were informed they may decline to participate or withdraw from participation in pharmacy services at any time.

## 2023-12-29 ENCOUNTER — PHARMACY VISIT (OUTPATIENT)
Dept: PHARMACY | Facility: CLINIC | Age: 23
End: 2023-12-29
Payer: MEDICAID

## 2024-01-09 ENCOUNTER — OFFICE VISIT (OUTPATIENT)
Dept: ENDOCRINOLOGY | Facility: CLINIC | Age: 24
End: 2024-01-09
Payer: COMMERCIAL

## 2024-01-09 VITALS
HEIGHT: 70 IN | DIASTOLIC BLOOD PRESSURE: 81 MMHG | RESPIRATION RATE: 16 BRPM | BODY MASS INDEX: 35.92 KG/M2 | TEMPERATURE: 96.9 F | WEIGHT: 250.9 LBS | HEART RATE: 103 BPM | SYSTOLIC BLOOD PRESSURE: 120 MMHG

## 2024-01-09 DIAGNOSIS — E11.65 UNCONTROLLED TYPE 2 DIABETES MELLITUS WITH HYPERGLYCEMIA (MULTI): ICD-10-CM

## 2024-01-09 DIAGNOSIS — E11.9 TYPE 2 DIABETES MELLITUS WITHOUT COMPLICATION, WITHOUT LONG-TERM CURRENT USE OF INSULIN (MULTI): Primary | ICD-10-CM

## 2024-01-09 DIAGNOSIS — Z11.3 ROUTINE SCREENING FOR STI (SEXUALLY TRANSMITTED INFECTION): ICD-10-CM

## 2024-01-09 PROCEDURE — RXMED WILLOW AMBULATORY MEDICATION CHARGE

## 2024-01-09 PROCEDURE — 1036F TOBACCO NON-USER: CPT

## 2024-01-09 PROCEDURE — 3079F DIAST BP 80-89 MM HG: CPT

## 2024-01-09 PROCEDURE — 3074F SYST BP LT 130 MM HG: CPT

## 2024-01-09 PROCEDURE — 99214 OFFICE O/P EST MOD 30 MIN: CPT

## 2024-01-09 PROCEDURE — 3008F BODY MASS INDEX DOCD: CPT

## 2024-01-09 PROCEDURE — 95251 CONT GLUC MNTR ANALYSIS I&R: CPT

## 2024-01-09 RX ORDER — INSULIN LISPRO 100 [IU]/ML
INJECTION, SOLUTION INTRAVENOUS; SUBCUTANEOUS
Qty: 20 ML | Refills: 3 | Status: SHIPPED | OUTPATIENT
Start: 2024-01-09

## 2024-01-09 RX ORDER — BLOOD-GLUCOSE SENSOR
EACH MISCELLANEOUS
Qty: 3 EACH | Refills: 11 | Status: SHIPPED | OUTPATIENT
Start: 2024-01-09

## 2024-01-09 RX ORDER — LOPERAMIDE HCL 2 MG
2 TABLET ORAL 4 TIMES DAILY PRN
Qty: 30 TABLET | Refills: 0 | Status: SHIPPED | OUTPATIENT
Start: 2024-01-09 | End: 2024-01-19

## 2024-01-09 RX ORDER — INSULIN GLARGINE 300 U/ML
INJECTION, SOLUTION SUBCUTANEOUS
Qty: 20 ML | Refills: 3 | Status: SHIPPED | OUTPATIENT
Start: 2024-01-09

## 2024-01-09 RX ORDER — BLOOD-GLUCOSE,RECEIVER,CONT
EACH MISCELLANEOUS
Qty: 1 EACH | Refills: 0 | Status: SHIPPED | OUTPATIENT
Start: 2024-01-09

## 2024-01-09 ASSESSMENT — PAIN SCALES - GENERAL: PAINLEVEL: 0-NO PAIN

## 2024-01-09 NOTE — PROGRESS NOTES
Subjective   Brenda Lawton is a 23 y.o. female presenting with history of insulin-dependent type 2 diabetes since age 13 who recently presented to the [facility] emergency department with 1 to 2 weeks of increased fatigue, polyuria, and polydipsia, had run out of insulin x 2 months. Does have a prior history of DKA requiring MICU admission earlier this year.    pt here today for diabetes post-hospital follow up visit.     Pt got insulin smart pen refills instead of pen themselves so did not have humalog or pen needles for 2-3 days after discharge  Pt has been meeting with  [facility] pharmacist  Has used dexcom in past, prefers it over ale, will send in for dexcom instead of ale refills   Restarted trulicity Friday and is experiencing GI SE including diarrhea and nausea. Will switch to ozempic.       Diabetes History  Outpatient provider for endocrine care PCP, date of last visit 8/30/23 phone call, has peviously seen Dr. Martinez, was on toujeo 100u daily  Initial diabetes diagnosis was made (year/age) July 2014 as DM2 by peds endo   GAD65 negative per chart review  Known complications due to diabetes include: hyperglycemia  Pt was unable to tolerate metformin d/t GI SE  Pt trialed trulicity for 2 months, did not continue d/t GI SE but was not sure if it was due to trulicity     Current Regimen  Trulicity 0.75mg once weekly- took it on Friday for first time, experiencing nausea and diarrhea  Lantus 45u at bedtime  Lispro 15u AC + Scale   = 0u  151-200 = 2u  201-250 = 4u  251-300 = 6u  301-350 = 8u  351-400 = 10u        Patient is using continuous glucose monitor freestyle ale 3    Past 2 days of glucoses once started on trulicity:       Hypoglycemia frequency: none    Regarding symptoms of hyperglycemia, the patient is not experiencing any symptoms such as polyuria, polydipsia, nocturia or rapid weight loss or blurry vision.    discussed GI SE of GLP-1 medication including poor appetite, nausea/v,  "diarrhea/constipation, indigestion, fullness, and rare SE of pancreatitis. Will switch from trulicity to ozempic to see if it is better tolerated     Pt requesting STI/STD testing. Discussed with patient I am able to order the labs but cannot treat any positive results as it is not in my scope of practice. Placed referral to OBGYN as well as PCP for patient. Pt voiced understanding, discussed with Dr. Olvera.     ROS  General: no fever, chills or acute changes in weight in the last 6 months  Skin: no rashes, pruritis or dry skin  Cardiac: denies chest pain, heart palpitations or orthopnea  Pulmonary: denies wheezing, productive cough or exertional dyspnea  All other systems reviewed as negative    Objective    Physical Exam  Blood pressure 120/81, pulse 103, temperature 36.1 °C (96.9 °F), resp. rate 16, height 1.778 m (5' 10\"), weight 114 kg (250 lb 14.4 oz).  General: not in acute distress, cooperative   Respiratory: normal respiratory effort  Musculoskeletal: normal gait       Current Outpatient Medications:     alcohol swabs pads, medicated, Apply 1 each topically 4 times a day. Use prior to checking glucose or injecting insulin, Disp: 400 each, Rfl: 0    blood sugar diagnostic (Blood Glucose Test) strip, 1 strip 4 times a day. Use to check glucose 4 times daily, before meals and at bedtime, Disp: 400 strip, Rfl: 0    blood sugar diagnostic (Blood Glucose Test) strip, 1 each 4 times a day. Use to check glucose 4 times daily, before meals and at bedtime, Disp: 400 strip, Rfl: 0    blood-glucose meter misc, 1 each 4 times a day. Use to check glucose 4 times daily, before meals and at bedtime, Disp: 1 each, Rfl: 0    Dexcom G4 platinum transmitter device, FOR CONTINUOUS GLUCOSE MONITORING, CHANGE EVERY 90 DAYS., Disp: 1 each, Rfl: 3    dulaglutide (Trulicity) 0.75 mg/0.5 mL pen injector, Inject 0.75 mg under the skin 1 (one) time per week., Disp: 2 mL, Rfl: 1    glucagon (Gvoke HypoPen 1-Pack) 1 mg/0.2 mL " "auto-injector, Inject 0.2 mL under the skin 1 time if needed (low blood sugar) for up to 1 dose., Disp: 0.2 mL, Rfl: 0    lancets 30 gauge misc, 1 each 4 times a day., Disp: 400 each, Rfl: 0    pen needle, diabetic 32 gauge x 5/32\" needle, 1 each 4 times a day. Use to inject insulin 4 times daily, Disp: 400 each, Rfl: 0    blood-glucose sensor (Dexcom G7 Sensor) device, Change sensor every 10 days, Disp: 3 each, Rfl: 11    Dexcom G4 platinum  (Dexcom G7 ) misc, Use as instructed to check glucose with sensors, Disp: 1 each, Rfl: 0    doxycycline (Vibramycin) 100 mg capsule, TAKE 1 CAPSULE BY MOUTH TWO TIMES A DAY, Disp: 14 capsule, Rfl: 0    insulin glargine (Toujeo Max U-300 SoloStar) 300 unit/mL (3 mL) injection, Take 45 units once daily at night, Disp: 20 mL, Rfl: 3    insulin lispro (HumaLOG KwikPen Insulin) 100 unit/mL injection, Take as directed per insulin instructions 12 units with meals plus scale, take up to 70 units daily, Disp: 20 mL, Rfl: 3    loperamide (Imodium A-D) 2 mg tablet, Take 1 tablet (2 mg) by mouth 4 times a day as needed for diarrhea for up to 10 days., Disp: 30 tablet, Rfl: 0    semaglutide 0.25 mg or 0.5 mg (2 mg/3 mL) pen injector, Take 0.25 mg once weekly, Disp: 3 mL, Rfl: 2    Lab Review  Lab Results   Component Value Date    BILITOT 0.4 12/14/2023    CALCIUM 8.7 12/15/2023    CO2 25 12/15/2023     12/15/2023    CREATININE 0.36 (L) 12/15/2023    GLUCOSE 262 (H) 12/15/2023    ALKPHOS 124 (H) 12/14/2023    K 3.5 12/15/2023    PROT 8.0 12/14/2023     12/15/2023    AST 12 12/14/2023    ALT 9 12/14/2023    BUN 8 12/15/2023    ANIONGAP 16 12/15/2023    MG 1.65 12/14/2023    PHOS 3.5 12/14/2023    GGT 1,445 (H) 02/25/2019    ALBUMIN 3.9 12/14/2023    AMYLASE 17 (L) 02/28/2019    LIPASE 20 02/28/2019    GFRF >90 02/25/2023    GFRMALE CANCELED 02/24/2023     Lab Results   Component Value Date    TRIG 136 07/14/2021    CHOL 197 07/14/2021    HDL 42.7 07/14/2021     Lab " Results   Component Value Date    HGBA1C 13.0 (H) 12/15/2023    HGBA1C CANCELED 02/22/2023    HGBA1C 12.8 (A) 02/22/2023     The ASCVD Risk score (Katiana BASSETT, et al., 2019) failed to calculate for the following reasons:    The 2019 ASCVD risk score is only valid for ages 40 to 79      Assessment/Plan   Updated insulin plan:    INSULIN INSTRUCTIONS   Breakfast time Lunch time Dinner time  Bedtime      Lantus/Touejo = 45 units   Humalog/Lispro = 12 units plus scale Humalog/Lispro = 12 units plus scale  Humalog/Lispro = 12 units plus scale      CORRECTIVE SCALE  GLUCOSE READING   HUMALOG/LISPRO SCALE DOSE    70 - 149 0   150 - 200 2   201 - 250 4   251 - 300 6   301 - 350 8   351 - 400+ 10     **Please keep a blood sugar log. You should measure your blood sugar first thing in the morning and before meals. If your AM blood sugar is > 150, you may increase your lantus by 2 units. Wait 2 days at the new dose prior to making any other changes. If your AM blood sugar is < 80, you may decrease your lantus by 2 units. Again, wait 2 days at the new dose prior to making any other changes.**      Start Jardiance 10mg every morning    Stop trulicity due to GI side effects, start ozempic 0.25 mg once weekly    Please get lab work done this week    Follow up with [facility] Pharmacist in 1 month   Follow up with Dr. Cintron at [facility] on 4/30/24

## 2024-01-09 NOTE — PATIENT INSTRUCTIONS
INSULIN INSTRUCTIONS   Breakfast time Lunch time Dinner time  Bedtime      Lantus/Touejo = 45 units   Humalog/Lispro = 12 units plus scale Humalog/Lispro = 12 units plus scale  Humalog/Lispro = 12 units plus scale      CORRECTIVE SCALE  GLUCOSE READING   HUMALOG/LISPRO SCALE DOSE    70 - 149 0   150 - 200 2   201 - 250 4   251 - 300 6   301 - 350 8   351 - 400+ 10     If your glucose is >200 when you wake up for 3 days in a row, you can increase your Lantus by 2 units.      Start Jardiance 10mg every morning    Stop trulicity due to GI side effects, start ozempic 0.25 mg once weekly    Please get diabetes lab work done prior to your next appt    Follow up with [facility] Pharmacist in 1 month   Follow up with Dr. Cintron at [facility] on 4/30/24

## 2024-01-10 ENCOUNTER — PHARMACY VISIT (OUTPATIENT)
Dept: PHARMACY | Facility: CLINIC | Age: 24
End: 2024-01-10
Payer: MEDICAID

## 2024-01-11 ENCOUNTER — PHARMACY VISIT (OUTPATIENT)
Dept: PHARMACY | Facility: CLINIC | Age: 24
End: 2024-01-11
Payer: COMMERCIAL

## 2024-01-16 PROCEDURE — RXMED WILLOW AMBULATORY MEDICATION CHARGE

## 2024-01-17 ENCOUNTER — PHARMACY VISIT (OUTPATIENT)
Dept: PHARMACY | Facility: CLINIC | Age: 24
End: 2024-01-17
Payer: MEDICAID

## 2024-01-23 DIAGNOSIS — Z79.4 TYPE 2 DIABETES MELLITUS WITH HYPERGLYCEMIA, WITH LONG-TERM CURRENT USE OF INSULIN (MULTI): ICD-10-CM

## 2024-01-23 DIAGNOSIS — E11.65 TYPE 2 DIABETES MELLITUS WITH HYPERGLYCEMIA, WITH LONG-TERM CURRENT USE OF INSULIN (MULTI): ICD-10-CM

## 2024-01-23 PROCEDURE — RXMED WILLOW AMBULATORY MEDICATION CHARGE

## 2024-01-23 RX ORDER — ISOPROPYL ALCOHOL 70 ML/100ML
1 SWAB TOPICAL 4 TIMES DAILY
Qty: 400 EACH | Refills: 0 | Status: CANCELLED | OUTPATIENT
Start: 2024-01-23 | End: 2024-04-22

## 2024-01-24 ENCOUNTER — TELEMEDICINE (OUTPATIENT)
Dept: PHARMACY | Facility: HOSPITAL | Age: 24
End: 2024-01-24
Payer: COMMERCIAL

## 2024-01-24 ENCOUNTER — TELEPHONE (OUTPATIENT)
Dept: ENDOCRINOLOGY | Facility: HOSPITAL | Age: 24
End: 2024-01-24

## 2024-01-24 DIAGNOSIS — E11.9 TYPE 2 DIABETES MELLITUS WITHOUT COMPLICATION, WITHOUT LONG-TERM CURRENT USE OF INSULIN (MULTI): Primary | ICD-10-CM

## 2024-01-24 PROCEDURE — RXMED WILLOW AMBULATORY MEDICATION CHARGE

## 2024-01-24 RX ORDER — TIRZEPATIDE 2.5 MG/.5ML
2.5 INJECTION, SOLUTION SUBCUTANEOUS
Qty: 2 ML | Refills: 1 | Status: SHIPPED | OUTPATIENT
Start: 2024-01-24

## 2024-01-24 NOTE — ASSESSMENT & PLAN NOTE
Patient is currently uncontrolled with A1C of 13% (goal< 7%).  Patient has started to use the Dexcom G6 sensors and states that he blood sugars have been around 200 over the last week.  Patient started Trulicity and developed sever GI side effect so medication was stopped.  Patient saw Endocrinology and they recommended starting Jardiance and Ozempic to help lower blood sugar and decrease insulin use.      SGLT-2 Education     - Counseled patient on Jardiance MOA, expectations, side effects, duration of therapy, administration, and monitoring parameters.  - Reviewed the benefits of SGLT-2i therapy, such as glycemic control and kidney and CV protection.  - Advised patient to practice proper  hygiene to reduce risk of UTIs or yeast infections.  - Advised patient to maintain adequate fluid intake to remain hydrated while on SGLT2i therapy.  - Answered all patient questions and concerns.        PLAN:  - START Jardiance 10 mg once daily as recommended by Endocrinology.  Prescription sent to [facility]   - START Ozempic 0.25 mg once weekly injection as recommended by Endocrinology.    - Continue Lantus and Humalog as previously prescribed.    - Continue to use Dexcom G6 sensors to monitor blood sugar   - Schedule appointment with Endocrinology    Follow up with [facility] (Endocrinology Pharmacist) on 2/19/2024

## 2024-01-24 NOTE — TELEPHONE ENCOUNTER
Called pt as insurance will not cover ozempic, prefers trulicity, however was unable to tolerate trulicity.   Pt states she has not tried her ozempic sample because she is afraid of GI SE.   We discussed switching to mounjaro to see if it will be covered by her insurance, will start on lowest dose to minimize GI SE.  Pt agreeable to plan, all questions answered

## 2024-01-24 NOTE — PROGRESS NOTES
Pharmacy Post-Discharge Visit  Brenda Lawton is a 23 y.o. female was referred to Clinical Pharmacy Team to complete a post-discharge medication optimization and monitoring visit.  The patient was referred for their Diabetes.    Referring Provider: Kaitlin España PA-C    Subjective   No Known Allergies    [facility]  [address], [address]  [facility] [address]  Phone: [phone] Fax: [phone]      Social History     Social History Narrative    Not on file        Diabetes  She presents for her initial diabetic visit. She has type 2 diabetes mellitus. There are no hypoglycemic associated symptoms. There are no hypoglycemic complications. She participates in exercise intermittently. Her overall blood glucose range is >200 mg/dl. An ACE inhibitor/angiotensin II receptor blocker is not being taken.     Review of Systems    Medication System Management:  Affordability/Accessibility: None  Adherence/Organization: None  Adverse Effects: None    Objective     There were no vitals taken for this visit.     LAB  Lab Results   Component Value Date    BILITOT 0.4 12/14/2023    CALCIUM 8.7 12/15/2023    CO2 25 12/15/2023     12/15/2023    CREATININE 0.36 (L) 12/15/2023    GLUCOSE 262 (H) 12/15/2023    ALKPHOS 124 (H) 12/14/2023    K 3.5 12/15/2023    PROT 8.0 12/14/2023     12/15/2023    AST 12 12/14/2023    ALT 9 12/14/2023    BUN 8 12/15/2023    ANIONGAP 16 12/15/2023    MG 1.65 12/14/2023    PHOS 3.5 12/14/2023    GGT 1,445 (H) 02/25/2019    ALBUMIN 3.9 12/14/2023    AMYLASE 17 (L) 02/28/2019    LIPASE 20 02/28/2019    GFRF >90 02/25/2023    GFRMALE CANCELED 02/24/2023     Lab Results   Component Value Date    TRIG 136 07/14/2021    CHOL 197 07/14/2021    HDL 42.7 07/14/2021     Lab Results   Component Value Date    HGBA1C 13.0 (H) 12/15/2023       Current Outpatient Medications on File Prior to Visit   Medication Sig Dispense Refill    alcohol swabs pads, medicated Apply 1 each  "topically 4 times a day. Use prior to checking glucose or injecting insulin 400 each 0    blood sugar diagnostic (Blood Glucose Test) strip 1 strip 4 times a day. Use to check glucose 4 times daily, before meals and at bedtime 400 strip 0    blood sugar diagnostic (Blood Glucose Test) strip 1 each 4 times a day. Use to check glucose 4 times daily, before meals and at bedtime 400 strip 0    blood-glucose meter misc 1 each 4 times a day. Use to check glucose 4 times daily, before meals and at bedtime 1 each 0    blood-glucose sensor (Dexcom G7 Sensor) device Change sensor every 10 days 3 each 11    Dexcom G4 platinum  (Dexcom G7 ) misc Use as instructed to check glucose with sensors 1 each 0    Dexcom G4 platinum transmitter device FOR CONTINUOUS GLUCOSE MONITORING, CHANGE EVERY 90 DAYS. 1 each 3    dulaglutide (Trulicity) 0.75 mg/0.5 mL pen injector Inject 0.75 mg under the skin 1 (one) time per week. 2 mL 1    glucagon (Gvoke HypoPen 1-Pack) 1 mg/0.2 mL auto-injector Inject 0.2 mL under the skin 1 time if needed (low blood sugar) for up to 1 dose. 0.2 mL 0    insulin glargine (Toujeo Max U-300 SoloStar) 300 unit/mL (3 mL) injection Take 45 units once daily at night 20 mL 3    insulin lispro (HumaLOG KwikPen Insulin) 100 unit/mL injection Take as directed per insulin instructions 12 units with meals plus scale, take up to 70 units daily 20 mL 3    lancets 30 gauge misc 1 each 4 times a day. 400 each 0    [] loperamide (Imodium A-D) 2 mg tablet Take 1 tablet (2 mg) by mouth 4 times a day as needed for diarrhea for up to 10 days. 30 tablet 0    pen needle, diabetic 32 gauge x 5/32\" needle 1 each 4 times a day. Use to inject insulin 4 times daily 400 each 0    semaglutide 0.25 mg or 0.5 mg (2 mg/3 mL) pen injector Take 0.25 mg once weekly 3 mL 2     No current facility-administered medications on file prior to visit.        HISTORICAL PHARMACOTHERAPY  -Metformin - constipation and stomach cramps "   - Trulicity- unknown     DRUG INTERATIONS  - None     Assessment/Plan   Problem List Items Addressed This Visit       Type 2 diabetes mellitus without complication, without long-term current use of insulin (CMS/[facility]) - Primary     Patient is currently uncontrolled with A1C of 13% (goal< 7%).  Patient has started to use the Dexcom G6 sensors and states that he blood sugars have been around 200 over the last week.  Patient started Trulicity and developed sever GI side effect so medication was stopped.  Patient saw Endocrinology and they recommended starting Jardiance and Ozempic to help lower blood sugar and decrease insulin use.      SGLT-2 Education     - Counseled patient on Jardiance MOA, expectations, side effects, duration of therapy, administration, and monitoring parameters.  - Reviewed the benefits of SGLT-2i therapy, such as glycemic control and kidney and CV protection.  - Advised patient to practice proper  hygiene to reduce risk of UTIs or yeast infections.  - Advised patient to maintain adequate fluid intake to remain hydrated while on SGLT2i therapy.  - Answered all patient questions and concerns.        PLAN:  - START Jardiance 10 mg once daily as recommended by Endocrinology.  Prescription sent to [facility]   - START Ozempic 0.25 mg once weekly injection as recommended by Endocrinology.    - Continue Lantus and Humalog as previously prescribed.    - Continue to use Dexcom G6 sensors to monitor blood sugar   - Schedule appointment with Endocrinology    Follow up with [facility] (Endocrinology Pharmacist) on 2/19/2024            Continue all meds under the continuation of care with the referring provider and clinical pharmacy team.    Eveline Salazar, Jb     Verbal consent to manage patient's drug therapy was obtained from the patient. They were informed they may decline to participate or withdraw from participation in pharmacy services at any time.

## 2024-01-25 ENCOUNTER — SPECIALTY PHARMACY (OUTPATIENT)
Dept: PHARMACY | Facility: CLINIC | Age: 24
End: 2024-01-25

## 2024-01-25 ENCOUNTER — PHARMACY VISIT (OUTPATIENT)
Dept: PHARMACY | Facility: CLINIC | Age: 24
End: 2024-01-25
Payer: MEDICAID

## 2024-01-26 ENCOUNTER — PHARMACY VISIT (OUTPATIENT)
Dept: PHARMACY | Facility: CLINIC | Age: 24
End: 2024-01-26
Payer: MEDICAID

## 2024-02-01 ENCOUNTER — PHARMACY VISIT (OUTPATIENT)
Dept: PHARMACY | Facility: CLINIC | Age: 24
End: 2024-02-01
Payer: MEDICAID

## 2024-02-01 PROCEDURE — RXMED WILLOW AMBULATORY MEDICATION CHARGE

## 2024-02-14 ENCOUNTER — PHARMACY VISIT (OUTPATIENT)
Dept: PHARMACY | Facility: CLINIC | Age: 24
End: 2024-02-14
Payer: MEDICAID

## 2024-02-14 PROCEDURE — RXMED WILLOW AMBULATORY MEDICATION CHARGE

## 2024-03-01 DIAGNOSIS — Z79.4 TYPE 2 DIABETES MELLITUS WITH HYPERGLYCEMIA, WITH LONG-TERM CURRENT USE OF INSULIN (MULTI): ICD-10-CM

## 2024-03-01 DIAGNOSIS — E11.65 TYPE 2 DIABETES MELLITUS WITH HYPERGLYCEMIA, WITH LONG-TERM CURRENT USE OF INSULIN (MULTI): ICD-10-CM

## 2024-03-01 PROCEDURE — RXMED WILLOW AMBULATORY MEDICATION CHARGE

## 2024-03-01 RX ORDER — PEN NEEDLE, DIABETIC 30 GX3/16"
1 NEEDLE, DISPOSABLE MISCELLANEOUS 4 TIMES DAILY
Qty: 400 EACH | Refills: 0 | Status: CANCELLED | OUTPATIENT
Start: 2024-03-01 | End: 2024-05-30

## 2024-03-05 ENCOUNTER — PHARMACY VISIT (OUTPATIENT)
Dept: PHARMACY | Facility: CLINIC | Age: 24
End: 2024-03-05
Payer: MEDICAID

## 2024-03-24 DIAGNOSIS — E11.9 TYPE 2 DIABETES MELLITUS WITHOUT COMPLICATION, WITHOUT LONG-TERM CURRENT USE OF INSULIN (MULTI): ICD-10-CM

## 2024-03-25 PROCEDURE — RXMED WILLOW AMBULATORY MEDICATION CHARGE

## 2024-03-26 ENCOUNTER — PHARMACY VISIT (OUTPATIENT)
Dept: PHARMACY | Facility: CLINIC | Age: 24
End: 2024-03-26
Payer: MEDICAID

## 2024-03-27 ENCOUNTER — PHARMACY VISIT (OUTPATIENT)
Dept: PHARMACY | Facility: CLINIC | Age: 24
End: 2024-03-27
Payer: MEDICAID

## 2024-04-05 ENCOUNTER — APPOINTMENT (OUTPATIENT)
Dept: ENDOCRINOLOGY | Facility: CLINIC | Age: 24
End: 2024-04-05
Payer: COMMERCIAL

## 2024-04-08 PROCEDURE — RXMED WILLOW AMBULATORY MEDICATION CHARGE

## 2024-04-09 RX ORDER — BLOOD-GLUCOSE TRANSMITTER
EACH MISCELLANEOUS
Qty: 1 EACH | Refills: 3 | OUTPATIENT
Start: 2024-04-09 | End: 2025-04-07

## 2024-04-11 ENCOUNTER — PHARMACY VISIT (OUTPATIENT)
Dept: PHARMACY | Facility: CLINIC | Age: 24
End: 2024-04-11
Payer: MEDICAID

## 2024-06-13 PROCEDURE — RXMED WILLOW AMBULATORY MEDICATION CHARGE

## 2024-06-14 ENCOUNTER — PHARMACY VISIT (OUTPATIENT)
Dept: PHARMACY | Facility: CLINIC | Age: 24
End: 2024-06-14
Payer: MEDICAID

## 2024-07-01 PROCEDURE — RXMED WILLOW AMBULATORY MEDICATION CHARGE

## 2024-07-05 ENCOUNTER — PHARMACY VISIT (OUTPATIENT)
Dept: PHARMACY | Facility: CLINIC | Age: 24
End: 2024-07-05
Payer: MEDICAID

## 2024-07-08 PROCEDURE — RXMED WILLOW AMBULATORY MEDICATION CHARGE

## 2024-07-09 ENCOUNTER — PHARMACY VISIT (OUTPATIENT)
Dept: PHARMACY | Facility: CLINIC | Age: 24
End: 2024-07-09
Payer: MEDICAID

## 2024-07-17 PROCEDURE — RXMED WILLOW AMBULATORY MEDICATION CHARGE

## 2024-07-19 DIAGNOSIS — E11.9 TYPE 2 DIABETES MELLITUS WITHOUT COMPLICATION, WITHOUT LONG-TERM CURRENT USE OF INSULIN (MULTI): ICD-10-CM

## 2024-07-19 PROCEDURE — RXMED WILLOW AMBULATORY MEDICATION CHARGE

## 2024-07-19 RX ORDER — INSULIN LISPRO 100 [IU]/ML
INJECTION, SOLUTION INTRAVENOUS; SUBCUTANEOUS
Qty: 15 ML | Refills: 0 | Status: SHIPPED | OUTPATIENT
Start: 2024-07-19

## 2024-07-20 ENCOUNTER — PHARMACY VISIT (OUTPATIENT)
Dept: PHARMACY | Facility: CLINIC | Age: 24
End: 2024-07-20
Payer: MEDICAID

## 2024-08-06 PROCEDURE — RXMED WILLOW AMBULATORY MEDICATION CHARGE

## 2024-08-08 ENCOUNTER — PHARMACY VISIT (OUTPATIENT)
Dept: PHARMACY | Facility: CLINIC | Age: 24
End: 2024-08-08
Payer: MEDICAID

## 2024-08-09 DIAGNOSIS — E11.9 TYPE 2 DIABETES MELLITUS WITHOUT COMPLICATION, WITHOUT LONG-TERM CURRENT USE OF INSULIN (MULTI): ICD-10-CM

## 2024-08-09 RX ORDER — INSULIN LISPRO 100 [IU]/ML
INJECTION, SOLUTION INTRAVENOUS; SUBCUTANEOUS
Qty: 15 ML | Refills: 0 | Status: CANCELLED | OUTPATIENT
Start: 2024-08-09

## 2024-09-05 PROCEDURE — RXMED WILLOW AMBULATORY MEDICATION CHARGE

## 2024-09-10 ENCOUNTER — PHARMACY VISIT (OUTPATIENT)
Dept: PHARMACY | Facility: CLINIC | Age: 24
End: 2024-09-10
Payer: MEDICAID

## 2024-10-03 PROCEDURE — RXMED WILLOW AMBULATORY MEDICATION CHARGE

## 2024-10-05 PROCEDURE — RXMED WILLOW AMBULATORY MEDICATION CHARGE

## 2024-10-07 ENCOUNTER — PHARMACY VISIT (OUTPATIENT)
Dept: PHARMACY | Facility: CLINIC | Age: 24
End: 2024-10-07
Payer: MEDICAID

## 2024-11-02 PROCEDURE — RXMED WILLOW AMBULATORY MEDICATION CHARGE

## 2024-11-08 ENCOUNTER — PHARMACY VISIT (OUTPATIENT)
Dept: PHARMACY | Facility: CLINIC | Age: 24
End: 2024-11-08
Payer: MEDICAID

## 2024-12-02 PROCEDURE — RXMED WILLOW AMBULATORY MEDICATION CHARGE

## 2024-12-04 ENCOUNTER — PHARMACY VISIT (OUTPATIENT)
Dept: PHARMACY | Facility: CLINIC | Age: 24
End: 2024-12-04
Payer: MEDICAID

## 2024-12-24 PROCEDURE — RXMED WILLOW AMBULATORY MEDICATION CHARGE

## 2024-12-26 ENCOUNTER — PHARMACY VISIT (OUTPATIENT)
Dept: PHARMACY | Facility: CLINIC | Age: 24
End: 2024-12-26
Payer: MEDICAID

## 2025-01-09 DIAGNOSIS — E11.9 TYPE 2 DIABETES MELLITUS WITHOUT COMPLICATION, WITHOUT LONG-TERM CURRENT USE OF INSULIN (MULTI): ICD-10-CM

## 2025-01-09 RX ORDER — BLOOD-GLUCOSE SENSOR
EACH MISCELLANEOUS
Qty: 3 EACH | Refills: 11 | OUTPATIENT
Start: 2025-01-09

## 2025-03-10 DIAGNOSIS — E11.9 TYPE 2 DIABETES MELLITUS WITHOUT COMPLICATION, WITHOUT LONG-TERM CURRENT USE OF INSULIN (MULTI): ICD-10-CM

## 2025-03-10 RX ORDER — INSULIN GLARGINE 300 [IU]/ML
INJECTION, SOLUTION SUBCUTANEOUS
Qty: 20 ML | Refills: 3 | OUTPATIENT
Start: 2025-03-10